# Patient Record
Sex: MALE | Race: BLACK OR AFRICAN AMERICAN | NOT HISPANIC OR LATINO | Employment: UNEMPLOYED | URBAN - METROPOLITAN AREA
[De-identification: names, ages, dates, MRNs, and addresses within clinical notes are randomized per-mention and may not be internally consistent; named-entity substitution may affect disease eponyms.]

---

## 2020-02-17 ENCOUNTER — OCCUPATIONAL HEALTH (OUTPATIENT)
Dept: URGENT CARE | Facility: CLINIC | Age: 40
End: 2020-02-17

## 2020-02-17 DIAGNOSIS — Z02.83 ENCOUNTER FOR DRUG SCREENING: Primary | ICD-10-CM

## 2020-02-17 LAB
CTP QC/QA: YES
POC 5 PANEL DRUG SCREEN: NEGATIVE

## 2020-02-17 PROCEDURE — 80305 POCT RAPID DRUG SCREEN 5 PANEL: ICD-10-PCS | Mod: QW,S$GLB,, | Performed by: EMERGENCY MEDICINE

## 2020-02-17 PROCEDURE — 80305 DRUG TEST PRSMV DIR OPT OBS: CPT | Mod: QW,S$GLB,, | Performed by: EMERGENCY MEDICINE

## 2020-11-27 ENCOUNTER — CLINICAL SUPPORT (OUTPATIENT)
Dept: REHABILITATION | Facility: HOSPITAL | Age: 40
End: 2020-11-27
Payer: MEDICAID

## 2020-11-27 DIAGNOSIS — Z78.9 IMPAIRED MOBILITY AND ADLS: ICD-10-CM

## 2020-11-27 DIAGNOSIS — R29.898 DECREASED GRIP STRENGTH: ICD-10-CM

## 2020-11-27 DIAGNOSIS — R27.8 COORDINATION IMPAIRMENT: ICD-10-CM

## 2020-11-27 DIAGNOSIS — Z74.09 IMPAIRED MOBILITY AND ADLS: ICD-10-CM

## 2020-11-27 DIAGNOSIS — R68.89 IMPAIRED FUNCTION OF UPPER EXTREMITY: ICD-10-CM

## 2020-11-27 PROCEDURE — 97165 OT EVAL LOW COMPLEX 30 MIN: CPT | Mod: PN
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
## 2020-11-27 NOTE — PROGRESS NOTES
Occupational Therapy   Evaluation    Froy Zuniga   MRN: [id number]     OT eval complete. Please see attached POC for details. Thank you for consult!    BIBI Auguste, ANANDA  11/27/2020

## 2020-12-01 ENCOUNTER — CLINICAL SUPPORT (OUTPATIENT)
Dept: REHABILITATION | Facility: HOSPITAL | Age: 40
End: 2020-12-01
Payer: MEDICAID

## 2020-12-01 DIAGNOSIS — Z74.09 IMPAIRED MOBILITY: ICD-10-CM

## 2020-12-01 DIAGNOSIS — R26.9 GAIT ABNORMALITY: Primary | ICD-10-CM

## 2020-12-01 PROCEDURE — 97161 PT EVAL LOW COMPLEX 20 MIN: CPT | Mod: PN
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
## 2020-12-04 ENCOUNTER — CLINICAL SUPPORT (OUTPATIENT)
Dept: REHABILITATION | Facility: HOSPITAL | Age: 40
End: 2020-12-04
Payer: MEDICAID

## 2020-12-04 DIAGNOSIS — R41.841 COGNITIVE COMMUNICATION DISORDER: ICD-10-CM

## 2020-12-04 PROCEDURE — 96125 COGNITIVE TEST BY HC PRO: CPT | Mod: PN
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
## 2020-12-07 NOTE — PROGRESS NOTES
See initial evaluation in Plan of Care.    Clementine Davis M.A. CCC-SLP  Speech Language Pathologist  12/7/2020

## 2020-12-08 ENCOUNTER — CLINICAL SUPPORT (OUTPATIENT)
Dept: REHABILITATION | Facility: HOSPITAL | Age: 40
End: 2020-12-08
Payer: MEDICAID

## 2020-12-08 DIAGNOSIS — Z74.09 IMPAIRED MOBILITY: ICD-10-CM

## 2020-12-08 PROCEDURE — 97116 GAIT TRAINING THERAPY: CPT | Mod: PN,CQ

## 2020-12-08 PROCEDURE — 97530 THERAPEUTIC ACTIVITIES: CPT | Mod: PN,CQ

## 2020-12-08 PROCEDURE — 97110 THERAPEUTIC EXERCISES: CPT | Mod: PN,CQ

## 2020-12-08 NOTE — PATIENT INSTRUCTIONS
Antiemboli: Isometric        Pull toes toward left knee, tense muscles on front of thigh and simultaneously squeeze buttocks. Keep leg and buttock flat on floor. Hold __5__ seconds.  Repeat _10___ times per set. Do __2__ sets per session. Do _1-2___ sessions per day.     https://[employer]/708     Copyright © [employer]. All rights reserved.   Strengthening: Hip Adduction - Isometric        With ball or folded pillow between knees, squeeze knees together. Hold __5__ seconds.  Repeat __10__ times per set. Do __2__ sets per session. Do __1-2__ sessions per day.     https://[employer]/612     Copyright © [employer]. All rights reserved.   Strengthening: Straight Leg Raise (Phase 1)        Tighten muscles on front of right thigh, then lift leg _18___ inches from surface, keeping knee locked.   Repeat __10__ times per set. Do __2__ sets per session. Do _1-2___ sessions per day.     https://[employer]/614     Copyright © [employer]. All rights reserved.   Strengthening: Terminal Knee Extension (Supine)        With right knee over bolster, straighten knee by tightening muscles on top of thigh. Keep bottom of knee on bolster.  Repeat _10___ times per set. Do __2__ sets per session. Do _1-2___ sessions per day.     https://[employer]/626     Copyright © [employer]. All rights reserved.   Hip Abduction / Adduction: with Extended Knee (Supine)        Bring left leg out to side and return. Keep knee straight.  Repeat __10__ times per set. Do _2__ sets per session. Do _1-2___ sessions per day.     https://[employer]/680     Copyright © [employer]. All rights reserved.   Self-Mobilization: Heel Slide (Supine)        Slide left heel toward buttocks until a gentle stretch is felt.   Repeat _10___ times per set. Do __2__ sets per session. Do _1-2___ sessions per day.     https://[employer]/710     Copyright © [employer]. All rights reserved.       Autonomic Dysreflexia  People with spinal cord injury (SCI) may be at risk for a serious problem called autonomic  dysreflexia (AD). If you have an SCI at level T6 or higher, you should be aware of this problem and how it could affect you.     During an AD episode, blood pressure stays high until the irritation is relieved. An AD episode that is not treated can lead to serious complications.   Understanding AD  AD occurs in episodes. The first one typically happens about 4 to 6 months after the SCI occurred. Episodes are usually triggered by irritation to the body below the point of the SCI. The irritation, often as minor as a full bladder, causes an abnormal response in the nervous system. This makes the blood vessels tighten. As a result, the blood pressure rises quickly. This can lead to stroke and even death. So, steps must be taken right away to find and relieve the irritation. This may require emergency treatment.  Symptoms of an AD episode  An AD episode can occur at any time. It can have several signs and symptoms. Along with high blood pressure, these include:  · Severe pounding headache  · Changes in heart rate (slow pulse)  · Sweating and blotchiness of the skin above the SCI, and pale, cold skin below it  · Blurred vision  · Nasal congestion  · Anxiety  · Nausea  · Goose bumps  Triggers of AD episodes  The most frequent triggers of AD episodes are the following:  · A bladder problem. This is most often an overfull bladder, which can happen if the person does not use a catheter. Or it can happen because of a clogged or kinked indwelling catheter. A urinary tract infection (UTI) can also be a trigger.  · A bowel problem. This is often a bowel filled with stool or gas. Or it could be an infection or hemorrhoids. Digital stimulation as part of a bowel program is another possible trigger.  · Other sources of discomfort. These include any other type of irritation. They could be wounds, pressure sores, broken bones, and even ingrown nails or sunburn. Sexual arousal, menstruation, and pregnancy and labor can also trigger an  AD episode.  What to do during an AD episode  Immediate treatment of an AD episode is critical. You (or your caregiver) should take the following steps if you have symptoms of an episode:  1. Sit up. This can help lower blood pressure. Keep the head elevated. The legs should be lowered if possible. Also, loosen any tight clothing, such as a belt or bra.  2. Monitor the blood pressure. It should be checked every 2 to 5 minutes.  3. Find and relieve the cause of the problem. Check the bladder and bowel first. Check the catheter (if used) and empty the bladder. If the bowel is full, empty it right away. Next, check for skin problems. These could be cuts or any kind of pressure on the skin. Also look for ingrown toenails. And check for any of the other possible triggers listed above.  4. If the problem cannot be found or relieved, call 911 or emergency services immediately. In the meantime, certain blood pressure medicines should be taken if they have been prescribed for this purpose. These can help keep the blood pressure under control until the irritation is relieved.  Prevention of AD episodes  AD episodes cant always be prevented. But good self-care is key to reducing the number of episodes you have. The following steps can help:  · Prevent your bladder from getting too full or infected (such as urinary tract infection or UTI). If you use a catheter, check it regularly for blockages.  · Follow a regular bowel program as instructed by your healthcare provider.  · Change positions often to relieve pressure on the skin from sitting or lying down.  · Watch for sores or pressure spots on the skin.  · Take all medicines as prescribed.  · Choose shoes carefully. Make sure they fit properly and arent too tight. Your healthcare provider may help with this.  · Check that there are no abimael or other objects in your shoes. Also make sure there are no wrinkles in your socks. And always wear socks with your shoes.  · Avoid  getting too much sun. Be sure to wear sunscreen and protective clothing.  · Monitor water temperatures to prevent scalding.  · Be extra careful to avoid falls, especially during transfers.  · Keep a card in your wallet that describes AD, its triggers, and proper treatment. This can help healthcare providers who may not be familiar with the condition.  · Establish a good relationship with a healthcare provider who is familiar with AD and spinal cord injuries.  For the caregiver  You can help your loved one prevent AD episodes. Learn AD triggers and symptoms. Help manage your loved ones catheter and bowel program. Watch for any skin problems or other injuries on the body below the point of the SCI. And know how to treat an AD episode. You can also help your loved one cope with AD. Living with the threat of an AD episode can be hard. Fear and depression are not uncommon in patients who have frequent episodes of AD. Talk with your loved one about his or her feelings and encourage your loved one to join a support group. Visit www.spinalcord.org for information.      Date Last Reviewed: 9/12/2015 © 2000-2017 [employer]. [address] [place] [address], [place] [address]. All rights reserved. This information is not intended as a substitute for professional medical care. Always follow your healthcare professional's instructions.

## 2020-12-09 NOTE — PROGRESS NOTES
Physical Therapy Treatment Note     Name: Froy Zuniga  Clinic Number: [id number]    Therapy Diagnosis:   Encounter Diagnosis   Name Primary?    Impaired mobility      Physician: Jeffrey Zacarias*    Visit Date: 12/8/2020    Physician Orders: PT Eval and Treat neuro program.  Medical Diagnosis from Referral: Injury of cervical spinal cord,sequela.  Evaluation Date: 12/1/2020  Authorization Period Expiration: 12/30/20  Plan of Care Expiration: 3/1/21  Visit # / Visits authorized: 2/12    Time In: 1705  Time Out: 1805  Total Billable Time: 55 minutes    Precautions: Standard and Fall, SCI, cervical collar    Subjective     Pt reports: no pain at present. Wearing abdominal binder and cervical collar. Owns KATT hose but not wearing them. Via manual w/c with son, Jr Froy, who is his primary caregiver. .  He was compliant with home exercise program given to him per rehab, which consisted of clamshells with RTB. .  Response to previous treatment: no soreness  Functional change: none stated    Pain: 0/10  Location: n/a     Objective     Froy received therapeutic exercises to develop strength, endurance, ROM, flexibility, posture and core stabilization for 35 minutes including:    Supine 10/2:   QS   SLR    HS   Hip abd long lever   Ballsqueezes   Bridges with A at hips and knees for positioning   PROM B LE's all planes: hips, knees, and ankles    Froy participated in dynamic functional therapeutic activities to improve functional performance for 10  minutes, including:    Transfers w/c-mat x 2 with CGA/min and VC for increased clearance  Sit-supine with CGA/min A and VC for technique     Froy participated in gait training to improve functional mobility and safety for 10  minutes, including:    Static standing in parallel bars pre-gait with CGA and tc for R knee TKE  Gait training x 10' in parallel bars with min to mod A for bracing R knee ext during stance and A for wider step widths for safety. VC for  sequencing.     Home Exercises Provided and Patient Education Provided     Education provided:   - Educated pt that he/she may feel soreness after session.    _instructed pt to wear KATT hose and abdominal binder for all sessions  - Issued 2 copies of autonomic dysreflexia handouts to pt for his son and his girlfriend and instruction to read over it for future reference. Discussed s/s AD, as well as triggers, symptoms, and what to do.     Written Home Exercises Provided: yes.  Exercises were reviewed and Froy was able to demonstrate them prior to the end of the session.  Froy demonstrated good  understanding of the education provided. Son verbalizes understanding.    See EMR under Patient Instructions for exercises provided 12/8/2020.    Assessment     Good tolerance for first session without pain. Tones in B LE's with PROM. Dizziness reported at end of gait training which subsided with sitting. R knee buckling with gait but able to be braced with PTA's knees for stability. Needs encouragement to perform w/c mobility without A and uses primarily LE's, requiring VC for decreased trunk A-P sway.   Froy is progressing well towards his goals.   Pt prognosis is Good/fair.     Pt will continue to benefit from skilled outpatient physical therapy to address the deficits listed in the problem list box on initial evaluation, provide pt/family education and to maximize pt's level of independence in the home and community environment.     Pt's spiritual, cultural and educational needs considered and pt agreeable to plan of care and goals.     Anticipated barriers to physical therapy: no    Goals:   Short Term Goals: 6 weeks (ongoing)  1.Decrease pain x 1 grade.  2.Start HEP. (initiated)  3.ROM of BLE WFL.  4.Strength 3+/5  5.Pt will ambulate 100' with AD with CGA.  Long Term Goals: 12 weeks   1.Pain 0-4/10.  2.Independent HEP.  3.Independent transfers.  4.ROM BLE WFL/WNL.  5.Strength BLE 5/5.  6.Pt will ambulate 500' with AD  with CGA.    Plan     Continue per POC, progressing as appropriate, working on strengthening, flexibility, transfers, and gait.     Danielle Wan, PTA

## 2020-12-10 ENCOUNTER — CLINICAL SUPPORT (OUTPATIENT)
Dept: REHABILITATION | Facility: HOSPITAL | Age: 40
End: 2020-12-10
Payer: MEDICAID

## 2020-12-10 DIAGNOSIS — Z78.9 IMPAIRED MOBILITY AND ADLS: Primary | ICD-10-CM

## 2020-12-10 DIAGNOSIS — R41.841 COGNITIVE COMMUNICATION DISORDER: ICD-10-CM

## 2020-12-10 DIAGNOSIS — Z74.09 IMPAIRED MOBILITY: ICD-10-CM

## 2020-12-10 DIAGNOSIS — R27.8 COORDINATION IMPAIRMENT: ICD-10-CM

## 2020-12-10 DIAGNOSIS — Z74.09 IMPAIRED MOBILITY AND ADLS: Primary | ICD-10-CM

## 2020-12-10 DIAGNOSIS — R29.898 DECREASED GRIP STRENGTH: ICD-10-CM

## 2020-12-10 DIAGNOSIS — R68.89 IMPAIRED FUNCTION OF UPPER EXTREMITY: ICD-10-CM

## 2020-12-10 PROCEDURE — 97530 THERAPEUTIC ACTIVITIES: CPT | Mod: PN,59

## 2020-12-10 PROCEDURE — 97110 THERAPEUTIC EXERCISES: CPT | Mod: PN,59

## 2020-12-10 PROCEDURE — 92507 TX SP LANG VOICE COMM INDIV: CPT | Mod: PN

## 2020-12-10 NOTE — PROGRESS NOTES
Occupational Therapy Treatment Note     Date: 12/10/2020  Name: Froy Zuniga  Clinic Number: [id number]    Therapy Diagnosis:   Encounter Diagnoses   Name Primary?    Impaired mobility and ADLs Yes    Decreased  strength     Coordination impairment     Impaired function of upper extremity      Physician: Jeffrey Zacarias*    Physician Orders: OT eval & Treat; neuro program  Medical Diagnosis: S14.109S (ICD-10-CM) - Injury of cervical spinal cord, sequela  Evaluation Date: 11/27/2020  Plan of Care Expiration Period: 2/19/2020  Insurance Authorization period Expiration: 12/31/2020  Date of Return to MD: tba  Visit # / Visits Authorized: 1 / 12    Time In:1520  Time Out: 1600  Total Billable Time: 40 minutes    Precautions:  Standard, blood thinners and Fall      Subjective     Pt reports: he had help with getting his shirt and jacket on today   he was compliant with home exercise program given last session (none given).   Response to previous treatment: no concerns  Functional change: none noted    Pain: 0/10  Location:  Neck; though he did c/o muscle spasm during sit->supine     Objective       Froy participated in dynamic functional therapeutic activities to improve functional performance for 40 minutes, including:    -Min A w/c<>mat squat-pivot t/f.  -Sit<>supine w/ CGA  -R UE: PROM   Abduction=93  ER@0*abd=31, ER@45*abd=40, ER@90*abd=26    -Gentle mobilization of joints involving R UE phalanges, metacarpals, carpals, radius and ulna. Gentle stretching of the soft tissues of the R wrist and hand to decrease edema and improve PROM, potentially allowing for increases in active movement.   -Pt ed re:  strengthening with yellow putty w/ min A for repositioning the putty in his hand. Increased time spent with this due to pt's concern about improving . Issued putty for HEP.    Home Exercises and Education Provided     Education provided:   - Progress towards goals   - OT issued communication  folder to increase communication between pt/family & staff. Ed that we will use it for brief daily updates to family, family/pt is welcome to write notes to staff in the daily communication pages and all HEP should be kept within the folder. He needs to bring it to therapy every session. He v/u.    Written Home Exercises Provided: yes.  Exercises were reviewed and Froy was able to demonstrate them prior to the end of the session.  Froy demonstrated good  understanding of the HEP provided.     See EMR under Patient Instructions for exercises provided 12/10/2020.       Assessment     Pt would continue to benefit from skilled OT. He is able to complete his t/f with min A and demo's understanding of HEP for  strengthening.     Froy is progressing well towards his goals and there are no updates to goals at this time. Pt prognosis is Fair.     Pt will continue to benefit from skilled outpatient occupational therapy to address the deficits listed in the problem list on initial evaluation provide pt/family education and to maximize pt's level of independence in the home and community environment.     Anticipated barriers to occupational therapy: high level of injury, possibly transportation as pt is unable to drive    Pt's spiritual, cultural and educational needs considered and pt agreeable to plan of care and goals.    Goals:  Short Term Goals: 4 weeks   Pt will be indep w/ initial HEP. (progressing 12/10/2020)   Pt will be able to squeeze 3# with the R UE, 30# w/ the L UE. (progressing 12/10/2020)   UB dressing w/ Mod I after s/u. (progressing 12/10/2020)   Grooming w/ Mod I w/ all supplies in reach. (progressing 12/10/2020)      Long Term Goals: 12 weeks   Pt will be mod I w/ progressive strengthening HEP. (progressing 12/10/2020)   Pt will be mod I w/ UB dressing. (progressing 12/10/2020)   Pt will complete LB dressing w/ min A and AE PRN. (progressing 12/10/2020)   Erik UE strength will increase by at least 1/2  grade throughout the arm. (progressing 12/10/2020)   Erik UE coordination will improve, as evidenced by a Box & Block score of at least 10 on the R UE and 50 on the L UE. (progressing 12/10/2020)   Supervision or better toileting and t/f. (progressing 12/10/2020)   Bed mobility goal to be set once assessed.  Goals for [facility] to be set as appropriate.    Plan   Certification Period/Plan of care expiration: 11/27/2020 to 2/19/2021.     Outpatient Occupational Therapy 2 times weekly for 12 weeks to include the following interventions: Manual Therapy, Neuromuscular Re-ed, Orthotic Management and Training, Patient Education, Self Care, Therapeutic Activities, Therapeutic Taping, and Therapeutic Exercise.    Updates/Grading for next session: review  strengthening    Maribeth Lima OT

## 2020-12-10 NOTE — PROGRESS NOTES
Physical Therapy Treatment Note     Name: Froy Zuniga  Clinic Number: [id number]    Therapy Diagnosis:   Encounter Diagnosis   Name Primary?    Impaired mobility      Physician: Jeffrey Zacarias MD    Visit Date: 12/10/2020    Physician Orders: eval and treat  Medical Diagnosis: Injury of cervical spinal cord  Date of Evaluation:  12/4/2020   Insurance Authorization Period: 11/25/2021  Plan of Care Certification:    12/4/2020 to 2/26/2021  Visit # / Visits Authorized:  3/ 12     Time In: 1545  Time Out: 1430  Total Billable Time: 55 minutes    Precautions: Standard and Fall, SCI, cervical collar    Subjective     Pt reports no subjective complaints.  : He was compliant with home exercise program given to him per rehab, which consisted of clamshells with RTB. .  Response to previous treatment: no soreness  Functional change: none stated    Pain: 0/10  Location: n/a     Objective     Froy received therapeutic exercises to develop strength, endurance, ROM, flexibility, posture and core stabilization for 45 minutes including:  Hamstring/heelcord stretches  Bridging 20  SAQ's manual resist to L 20 each  SLR 4#s cuff weights to Left 20 each.  Crooklying hip abduction/.adduction manual resist 20/20  Sit to stand 20  Weight shifts/side to side sway 20  GT with HHA 20 ft x 2.    Home Exercises Provided and Patient Education Provided     Education provided:   - Educated on importance of right side weight bearing to inhibit muscle spasm.    Written Home Exercises Provided: yes.  Exercises were reviewed and Froy was able to demonstrate them prior to the end of the session.  Froy demonstrated good  understanding of the education provided. Son verbalizes understanding.    See EMR under Patient Instructions for exercises provided 12/8/2020.    Assessment     Patient demonstrated adequate knee stability but no upperbody support for RW ambulation. Ambulated with HHA. Needs mod-max A to modify midline alignment and stay  on task. Tolerated Rx well.  /83 supine; 98/69 sitting and 113/78mmHg after walking. Hr 79-83bpm throughout activity.   Froy is progressing well towards his goals.   Pt prognosis is Good/fair.     Pt will continue to benefit from skilled outpatient physical therapy to address the deficits listed in the problem list box on initial evaluation, provide pt/family education and to maximize pt's level of independence in the home and community environment.     Pt's spiritual, cultural and educational needs considered and pt agreeable to plan of care and goals.     Anticipated barriers to physical therapy: no    Goals: (ongoing)  1.Decrease pain x 1 grade.  2.Start HEP.  3.ROM of BLE WFL.  4.Strength 3+/5  5.Pt will ambulate 100' with AD with CGA.    Plan     GT/Weight shifting  Continue POC    Devin Lee, PT
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
## 2020-12-15 ENCOUNTER — CLINICAL SUPPORT (OUTPATIENT)
Dept: REHABILITATION | Facility: HOSPITAL | Age: 40
End: 2020-12-15
Payer: MEDICAID

## 2020-12-15 DIAGNOSIS — Z78.9 IMPAIRED MOBILITY AND ADLS: Primary | ICD-10-CM

## 2020-12-15 DIAGNOSIS — Z74.09 IMPAIRED MOBILITY AND ADLS: Primary | ICD-10-CM

## 2020-12-15 DIAGNOSIS — R27.8 COORDINATION IMPAIRMENT: ICD-10-CM

## 2020-12-15 DIAGNOSIS — R68.89 IMPAIRED FUNCTION OF UPPER EXTREMITY: ICD-10-CM

## 2020-12-15 DIAGNOSIS — Z74.09 IMPAIRED MOBILITY: ICD-10-CM

## 2020-12-15 DIAGNOSIS — R41.841 COGNITIVE COMMUNICATION DISORDER: ICD-10-CM

## 2020-12-15 DIAGNOSIS — R29.898 DECREASED GRIP STRENGTH: ICD-10-CM

## 2020-12-15 PROCEDURE — 97530 THERAPEUTIC ACTIVITIES: CPT | Mod: PN,59

## 2020-12-15 PROCEDURE — 97530 THERAPEUTIC ACTIVITIES: CPT | Mod: PN,CQ

## 2020-12-15 PROCEDURE — 97110 THERAPEUTIC EXERCISES: CPT | Mod: PN,CQ,59

## 2020-12-15 PROCEDURE — 92507 TX SP LANG VOICE COMM INDIV: CPT | Mod: PN

## 2020-12-15 NOTE — PATIENT INSTRUCTIONS
Home Exercise Program:     1. Putty :     Squeeze putty in hand trying to keep it round by rotating putty after each squeeze. Push fingers through putty to palm each time. Avoid excessive pain. 20 squeezes, 3 times per day.     2.  Thumb Putty Pinch:    Hold the putty at the top of your hand. Squeeze the putty between your thumb and the side of your 2nd finger as shown. Repeat 15 pinches. 3 times per day.

## 2020-12-15 NOTE — PROGRESS NOTES
"Outpatient Neurological Rehabilitation   Speech and Language Therapy Treatment Note  Date:  12/15/2020     Name: Froy Zuniga   MRN: [id number]   Therapy Diagnosis:   Encounter Diagnosis   Name Primary?    Cognitive communication disorder    Physician: Jeffrey Zacarias*  Physician Orders: Ambulatory Referral to Speech Therapy  Medical Diagnosis: Injury of cervical spinal cord    Visit #/ Visits Authorized: 2/ 12  Date of Evaluation:  12/4/2020  Insurance Authorization Period: 12/8/2020-4/26/2020  Plan of Care Expiration Date:    2/26/2021  Extended POC:  n/a   Progress Note: due 1/4/2021   Visits Cancelled: 0  Visits No Show: 0    Time In:  1137  Time Out:  1220  Total Billable Time: 42    Precautions: Standard, Fall and Cervical Precautions  Subjective:   Pt reports: "I am just mad and want my life back."   He was compliant to home exercise program.   Response to previous treatment: n/a   Pain Scale:  0/10 on VAS currently.   Pain Location: n/a  Objective:   UNTIMED  Procedure Min.   Speech- Language- Voice Therapy  42   Total Timed Units: 0  Total Untimed Units: 1  Charges Billed/# of units: 1    Short Term Goals: (8 weeks) Current Progress:   Pt will recall 4 memory strategies independently 3 times overall.    Progressing/ Not Met 12/15/2020   Memory Strategies reviewed.    Pt will recall details from a paragraph presented verbally with 80% accuracy independently immediately following presentation.     Progressing/ Not Met 12/15/2020   Not addressed in today's session.    Pt will complete moderate level problem solving tasks with minimal cues for accurate completion.     Progressing/ Not Met 12/15/2020   Pt completed a moderate level problem solving task where the patient had to plan the most efficient route for a given list of errands on a map. Moderate cues for completion.    Pt rated the task a 4 on The Relative Scale of Cognitive Load     Pt will complete moderate level visual attention tasks tasks " with minimal cues for accurate completion.     Progressing/ Not Met 12/15/2020   Not addressed in today's session.     Pt will sort given medications with 90% accuracy given minimal cues.     Progressing/ Not Met 12/15/2020   Pt sorted given medications with 80% accuracy independently. 90% achieved given minimal cues.    Met x1   Pt will complete goal plan action review with 90% accuracy independently.      Progressing/ Not Met 12/15/2020   Not addressed in today's session.       Patient Education/Response:   Pt educated on the cognitive triangle and the spoon theory for TBI.     Written Home Exercises Provided: Patient instructed to cont prior HEP.  Exercises were reviewed and Froy was able to demonstrate them prior to the end of the session.  Froy demonstrated good  understanding of the education provided.     See EMR under Patient Instructions for exercises provided 12/10/2020.  Assessment:   Froy is progressing well towards his goals. Safety awareness and judgement with fatigue discussed with patient. Pt with overall difficulty initiating most tasks. Pt currently has no responsibilities at home. Current goals remain appropriate. Goals to be updated as necessary.     Pt prognosis is Good. Pt will continue to benefit from skilled outpatient speech and language therapy to address the deficits listed in the problem list on initial evaluation, provide pt/family education and to maximize pt's level of independence in the home and community environment.   Medical necessity is demonstrated by the following IMPAIRMENTS:  Deficits in executive functioning, attention, and memory prevent the pt from relaying medically and safety relevant information in a timely manner in a state of emergency.  Barriers to Therapy: none identified  Pt's spiritual, cultural and educational needs considered and pt agreeable to plan of care and goals.  Plan:   Continue POC with focus on rehabilitation and compensation for multiple domains of  cognition.     Clementine Davis CCC-SLP   12/15/2020

## 2020-12-15 NOTE — PROGRESS NOTES
Physical Therapy Treatment Note     Name: Froy Zuniga  Clinic Number: [id number]    Therapy Diagnosis:   Encounter Diagnosis   Name Primary?    Impaired mobility      Physician: Jeffrey Zacarias*    Visit Date: 12/15/2020    Physician Orders: PT Eval and Treat neuro program.  Medical Diagnosis from Referral: Injury of cervical spinal cord,sequela.  Evaluation Date: 12/1/2020  Authorization Period Expiration: 12/30/20  Plan of Care Expiration: 3/1/21  Visit # / Visits authorized: 4/12    Time In: 1447  Time Out: 1545  Total Billable Time: 55 minutes    Precautions: Standard and Fall, SCI, cervical collar    Subjective     Pt reports: no pain at present. Wearing abdominal binder and cervical collar. Wearing KATT hose. Via manual w/c with son, Jr Froy, who is his primary caregiver, and father, Froy, .   He was compliant with home exercise program given to him per rehab, which consisted of clamshells with RTB. .  Response to previous treatment: no soreness  Functional change: none stated    Pain: 0/10  Location: n/a     Objective       BP in w/c before session: 116/77, HR 66  BP supine after ex's 143/89, HR 68  BP with initial sitting 114/70, HR 80    A for readjustment and tightening of abdominal binder    Froy received therapeutic exercises to develop strength, endurance, ROM, flexibility, posture and core stabilization for 40 minutes including:    Supine 10/2:   SLR    HS   Bridges with A at hips and knees for positioning   PROM B LE's all planes: hips, knees, and ankles   AP B 10/2   Lino's knee-shin-ankle touches 10/10 L/R     Seated:   Trunk A-P, L-R leans with min A 10 each    Froy participated in dynamic functional therapeutic activities to improve functional performance for 15 minutes, including:    Transfers w/c-mat x 2 with CGA  Sit-supine with CGA/min A and VC for technique   Static standing x 5 minutes with min A and tc for R>L knee TKE 2*  Buckling  Standing lateral weight shifting L/R  with min A and tc for B knee ext    Home Exercises Provided and Patient Education Provided     Education provided:   - Educated pt that he/she may feel soreness after session.    _instructed pt to wear KATT hose and abdominal binder for all sessions  - Reviewed s/s AD with pt and family    Written Home Exercises Provided: Patient instructed to cont prior HEP.  Exercises were reviewed and Froy was able to demonstrate them prior to the end of the session.  Froy demonstrated good  understanding of the education provided. Son verbalizes understanding.    See EMR under Patient Instructions for exercises provided 12/8/2020.    Assessment     Buckling LE's with standing today, possibly 2* LE fatigue. TC for proper foot placement with Lino's ex's.     Froy is progressing well towards his goals.   Pt prognosis is Good/fair.     Pt will continue to benefit from skilled outpatient physical therapy to address the deficits listed in the problem list box on initial evaluation, provide pt/family education and to maximize pt's level of independence in the home and community environment.     Pt's spiritual, cultural and educational needs considered and pt agreeable to plan of care and goals.     Anticipated barriers to physical therapy: no    Goals:   Short Term Goals: 6 weeks (ongoing)  1.Decrease pain x 1 grade.  2.Start HEP. (initiated)  3.ROM of BLE WFL.  4.Strength 3+/5  5.Pt will ambulate 100' with AD with CGA.    Long Term Goals: 12 weeks   1.Pain 0-4/10.  2.Independent HEP.  3.Independent transfers.  4.ROM BLE WFL/WNL.  5.Strength BLE 5/5.  6.Pt will ambulate 500' with AD with CGA.    Plan     Continue per POC, progressing as appropriate, working on strengthening, flexibility, transfers, and gait.     Danielle Wan, PTA

## 2020-12-15 NOTE — PROGRESS NOTES
Occupational Therapy Treatment Note     Date: 12/15/2020  Name: Froy Zuniga  Clinic Number: [id number]    Therapy Diagnosis:   No diagnosis found.  Physician: Jeffrey Zacarias*    Physician Orders: OT eval & Treat; neuro program  Medical Diagnosis: S14.109S (ICD-10-CM) - Injury of cervical spinal cord, sequela  Evaluation Date: 11/27/2020  Plan of Care Expiration Period: 2/19/2020  Insurance Authorization period Expiration: 12/31/2020  Date of Return to MD: tba  Visit # / Visits Authorized: 2 / 12    Time In:1352  Time Out: 1433  Total Billable Time: 40 minutes    Precautions:  Standard, blood thinners and Fall      Subjective     Pt reports: he had help with getting his shirt and jacket on today   he was compliant with home exercise program given last session for putty, but can't remember what we did in our last session   Response to previous treatment: no concerns  Functional change: none noted by pt    Pain: 0/10  Location:  Neck; though he did c/o muscle spasm during sit->supine     Objective       Froy participated in dynamic functional therapeutic activities to improve functional performance for 40 minutes, including:    When OT met pt in [facility], his son was placing his medications into his mouth despite the fact that Froy's L UE is working sufficiently for med management. He did not bring his yellow communication folder with him.     CGA squat-pivot w/c->mat t/f.    Gentle mobilization of joints involving R UE phalanges, metacarpals, carpals, radius and ulna. Gentle stretching of the soft tissues of the R wrist and hand to decrease edema and improve PROM, potentially allowing for increases in active movement.     fxl use R UE for support w/ tactile input at hand and distal triceps for stabilization. Pt was able to push through hand (positioned on yoga mat to increase stability and decrease chance for the hand slipping) to lift himself for repositioning and pressure relief. Multiple trials.    Mario  jacket x2 w/ min A; don jacket x1 w/ mod A    Seated at EOM, attempted touching chin    Hand Strength (OSMAR Dynamometer, Setting II)   (lbs) Right 12/15/2020    Right 11/27/2020   1 4 0   2 3 0   3 2 0   avg 3 0   [norms for men aged 40-44: R=116.8 +/-20.7; L=112.8 +/-18.7 (Micki et al, 1985)]    Sit->supine w/ SBA    Supine chest press 2x5 w/ facilitation of scap rotation and min A  OT ed pt & son for completion of AAROM chest press at home. They completed x5 w/ min cues for best performance.  Supine reach to chin AROM x5    Supine ->sit w/ supervision    Mat->w/c squat-pivot t/f to the R w/ min A, as well as assist to keep the R UE positioned on the armrest of the w/c. Arm-rest wrapped with yoga-mat to provide non-slip surface, increasing his ability to use the R UE during transfers and for repositioning himself in his w/c.     Home Exercises and Education Provided     Education provided:   - Progress towards goals   - need to bring communication folder!  - ed pt & family that Froy needs to be encouraged to do as much as possible for himself within the limits of safety. Family is encouraged to help with specific challenges in a task, but not to complete tasks for pt. Pt, son & father v/u.     Written Home Exercises Provided: Patient instructed to cont prior HEP.  Exercises were reviewed and Froy was able to demonstrate them prior to the end of the session.  Froy demonstrated good  understanding of the HEP provided.     See EMR under Patient Instructions for exercises provided 12/10/2020.       Assessment     Pt would continue to benefit from skilled OT. Froy met the ST goal for R UE  strength today! He also demo'd ability to use the R UE for support in a functional way, which is a significant improvement from last week.      He is able to complete his t/f with min A and demo's understanding of HEP for  strengthening.     Froy is progressing well towards his goals and there are no updates to goals  at this time. Pt prognosis is Fair.     Pt will continue to benefit from skilled outpatient occupational therapy to address the deficits listed in the problem list on initial evaluation provide pt/family education and to maximize pt's level of independence in the home and community environment.     Anticipated barriers to occupational therapy: high level of injury, possibly transportation as pt is unable to drive    Pt's spiritual, cultural and educational needs considered and pt agreeable to plan of care and goals.    Goals:  Short Term Goals: 4 weeks   Pt will be indep w/ initial HEP. (progressing 12/15/2020)   Pt will be able to squeeze 3# with the R UE, 30# w/ the L UE. (MET for R UE 12/15/2020; progressing for L UE 12/15/2020)   UB dressing w/ Mod I after s/u. (progressing 12/15/2020)   Grooming w/ Mod I w/ all supplies in reach. (progressing 12/15/2020)      Long Term Goals: 12 weeks   Pt will be mod I w/ progressive strengthening HEP. (progressing 12/15/2020)   Pt will be mod I w/ UB dressing. (progressing 12/15/2020)   Pt will complete LB dressing w/ min A and AE PRN. (progressing 12/15/2020)   Erik UE strength will increase by at least 1/2 grade throughout the arm. (progressing 12/15/2020)   Erik UE coordination will improve, as evidenced by a Box & Block score of at least 10 on the R UE and 50 on the L UE. (progressing 12/15/2020)   Supervision or better toileting and t/f. (progressing 12/15/2020)   New Goal: Mod I bed mobility. (progressing 12/15/2020)   Goals for [facility] to be set as appropriate.    Plan   Certification Period/Plan of care expiration: 11/27/2020 to 2/19/2021.     Outpatient Occupational Therapy 2 times weekly for 12 weeks to include the following interventions: Manual Therapy, Neuromuscular Re-ed, Orthotic Management and Training, Patient Education, Self Care, Therapeutic Activities, Therapeutic Taping, and Therapeutic Exercise.    Updates/Grading for next session: review new HEP, UB  dressing    Maribeth Lima, OT

## 2020-12-17 ENCOUNTER — OFFICE VISIT (OUTPATIENT)
Dept: UROLOGY | Facility: CLINIC | Age: 40
End: 2020-12-17
Payer: MEDICAID

## 2020-12-17 ENCOUNTER — CLINICAL SUPPORT (OUTPATIENT)
Dept: REHABILITATION | Facility: HOSPITAL | Age: 40
End: 2020-12-17
Payer: MEDICAID

## 2020-12-17 VITALS — HEART RATE: 73 BPM | SYSTOLIC BLOOD PRESSURE: 112 MMHG | DIASTOLIC BLOOD PRESSURE: 74 MMHG

## 2020-12-17 DIAGNOSIS — R41.841 COGNITIVE COMMUNICATION DISORDER: ICD-10-CM

## 2020-12-17 DIAGNOSIS — N31.9 NEUROGENIC BLADDER: ICD-10-CM

## 2020-12-17 DIAGNOSIS — S14.109S INJURY OF CERVICAL SPINAL CORD, SEQUELA: ICD-10-CM

## 2020-12-17 DIAGNOSIS — R29.898 DECREASED GRIP STRENGTH: ICD-10-CM

## 2020-12-17 DIAGNOSIS — Z74.09 IMPAIRED MOBILITY: Primary | ICD-10-CM

## 2020-12-17 DIAGNOSIS — G82.20 PARAPLEGIA: Primary | ICD-10-CM

## 2020-12-17 DIAGNOSIS — R26.9 GAIT ABNORMALITY: ICD-10-CM

## 2020-12-17 DIAGNOSIS — Z78.9 IMPAIRED MOBILITY AND ADLS: Primary | ICD-10-CM

## 2020-12-17 DIAGNOSIS — R27.8 COORDINATION IMPAIRMENT: ICD-10-CM

## 2020-12-17 DIAGNOSIS — Z74.09 IMPAIRED MOBILITY AND ADLS: Primary | ICD-10-CM

## 2020-12-17 DIAGNOSIS — R68.89 IMPAIRED FUNCTION OF UPPER EXTREMITY: ICD-10-CM

## 2020-12-17 PROCEDURE — 99999 PR PBB SHADOW E&M-EST. PATIENT-LVL III: CPT | Mod: PBBFAC,,, | Performed by: UROLOGY

## 2020-12-17 PROCEDURE — 97110 THERAPEUTIC EXERCISES: CPT | Mod: PN,CQ,59

## 2020-12-17 PROCEDURE — 99204 PR OFFICE/OUTPT VISIT, NEW, LEVL IV, 45-59 MIN: ICD-10-PCS | Mod: S$PBB,,, | Performed by: UROLOGY

## 2020-12-17 PROCEDURE — 97530 THERAPEUTIC ACTIVITIES: CPT | Mod: PN,59

## 2020-12-17 PROCEDURE — 92507 TX SP LANG VOICE COMM INDIV: CPT | Mod: PN

## 2020-12-17 PROCEDURE — 99204 OFFICE O/P NEW MOD 45 MIN: CPT | Mod: S$PBB,,, | Performed by: UROLOGY

## 2020-12-17 PROCEDURE — 97530 THERAPEUTIC ACTIVITIES: CPT | Mod: PN,CQ

## 2020-12-17 PROCEDURE — 99999 PR PBB SHADOW E&M-EST. PATIENT-LVL III: ICD-10-PCS | Mod: PBBFAC,,, | Performed by: UROLOGY

## 2020-12-17 PROCEDURE — 99213 OFFICE O/P EST LOW 20 MIN: CPT | Mod: PBBFAC,PO | Performed by: UROLOGY

## 2020-12-17 RX ORDER — TAMSULOSIN HYDROCHLORIDE 0.4 MG/1
0.4 CAPSULE ORAL DAILY
COMMUNITY
Start: 2020-11-19 | End: 2021-02-17

## 2020-12-17 RX ORDER — ACETAMINOPHEN 500 MG
5 TABLET ORAL DAILY
COMMUNITY
Start: 2020-11-19 | End: 2021-02-17

## 2020-12-17 RX ORDER — ASPIRIN 81 MG/1
81 TABLET ORAL DAILY
COMMUNITY
Start: 2020-11-20 | End: 2021-02-18

## 2020-12-17 RX ORDER — LIDOCAINE 50 MG/G
1 PATCH TOPICAL
COMMUNITY
Start: 2020-11-20 | End: 2021-02-18

## 2020-12-17 RX ORDER — DICLOFENAC SODIUM 10 MG/G
1 GEL TOPICAL
COMMUNITY
Start: 2020-11-19 | End: 2021-02-17

## 2020-12-17 RX ORDER — OXYCODONE HYDROCHLORIDE 5 MG/1
5 TABLET ORAL
COMMUNITY
Start: 2020-11-23 | End: 2021-03-28

## 2020-12-17 RX ORDER — METHOCARBAMOL 750 MG/1
750 TABLET, FILM COATED ORAL DAILY
COMMUNITY
Start: 2020-11-19 | End: 2021-02-17

## 2020-12-17 RX ORDER — GABAPENTIN 300 MG/1
600 CAPSULE ORAL DAILY
COMMUNITY
Start: 2020-11-19 | End: 2021-02-17

## 2020-12-17 RX ORDER — ENOXAPARIN SODIUM 100 MG/ML
40 INJECTION SUBCUTANEOUS DAILY
COMMUNITY
Start: 2020-11-20 | End: 2021-01-06

## 2020-12-17 RX ORDER — BUSPIRONE HYDROCHLORIDE 5 MG/1
5 TABLET ORAL DAILY
COMMUNITY
Start: 2020-11-19 | End: 2021-05-12

## 2020-12-17 RX ORDER — BETHANECHOL CHLORIDE 5 MG/1
10 TABLET ORAL DAILY
COMMUNITY
Start: 2020-11-19 | End: 2021-02-17

## 2020-12-17 RX ORDER — ESCITALOPRAM OXALATE 10 MG/1
10 TABLET ORAL DAILY
COMMUNITY
Start: 2020-11-20 | End: 2021-02-18

## 2020-12-17 NOTE — PROGRESS NOTES
"  Physical Therapy Treatment Note     Name: Froy Zuniga  Clinic Number: [id number]    Therapy Diagnosis:   Encounter Diagnoses   Name Primary?    Impaired mobility Yes    Gait abnormality     Injury of cervical spinal cord, sequela      Physician: Jeffrey Zacarias*    Visit Date: 12/17/2020    Physician Orders: PT Eval and Treat neuro program.  Medical Diagnosis from Referral: Injury of cervical spinal cord,sequela.  Evaluation Date: 12/1/2020  Authorization Period Expiration: 12/30/20  Plan of Care Expiration: 3/1/21  Visit # / Visits authorized: 5/12    Time In: 0956  Time Out: 1044  Total Billable Time: 48 minutes    Precautions: Standard and Fall, SCI, cervical collar    Subjective     Pt reports: "I am doing ok today"  He was not compliant with home exercise program.  Response to previous treatment: no soreness  Functional change: none stated     Pain: 0/10  Location: n/a     Objective       BP in w/c before session: 131/85, HR 70  BP supine after ex's 143/90, HR 58  BP with initial sitting 100/68, HR 72    Froy received therapeutic exercises to develop strength, endurance, ROM, flexibility, posture and core stabilization for 35 minutes including:  Supine 10/2:   SLR    HS   Bridges with A at hips and knees for positioning   AP B    Lino's knee-shin-ankle touches 20/20 L/R   LAQ     Seated:   Scooting L/R and fwd/bwd    Froy participated in dynamic functional therapeutic activities to improve functional performance for 10 minutes, including:    Transfers w/c-mat x 2 with CGA  Sit-supine with CGA/min A and VC for technique   // bars:   Fwd/bwd gait 10ft x 4   Side stepping 10ft x 4   Marching 2/10   Heel raises 2/10       Home Exercises Provided and Patient Education Provided     Education provided:   - Educated pt that he/she may feel soreness after session.    _instructed pt to wear KATT hose and abdominal binder for all sessions  - Reviewed s/s AD with pt and family    Written Home Exercises " Provided: Patient instructed to cont prior HEP.  Exercises were reviewed and Froy was able to demonstrate them prior to the end of the session.  Froy demonstrated good  understanding of the education provided. Son verbalizes understanding.    See EMR under Patient Instructions for exercises provided 12/8/2020.    Assessment     Patient with gait in // bars with very wide and long stride, knees occasionally buckling, however, patient was able to maintain upright with no balance deficits requiring PTA assist.  Remains with tone occasionally causing UE's to flex.  Good performance of therex with increased assist on R LE with stretching.    Froy is progressing well towards his goals.   Pt prognosis is Good/fair.     Pt will continue to benefit from skilled outpatient physical therapy to address the deficits listed in the problem list box on initial evaluation, provide pt/family education and to maximize pt's level of independence in the home and community environment.     Pt's spiritual, cultural and educational needs considered and pt agreeable to plan of care and goals.     Anticipated barriers to physical therapy: no    Goals:   Short Term Goals: 6 weeks (ongoing)  1.Decrease pain x 1 grade.(progressing)  2.Start HEP. (initiated)  3.ROM of BLE WFL.(progressing)  4.Strength 3+/5(progressing)  5.Pt will ambulate 100' with AD with CGA.(progressing)    Long Term Goals: 12 weeks   1.Pain 0-4/10.(progressing)  2.Independent HEP.(progressing)  3.Independent transfers.(progressing)  4.ROM BLE WFL/WNL. (progressing)  5.Strength BLE 5/5.(progressing)  6.Pt will ambulate 500' with AD with CGA.(progressing)    Plan     Continue per POC, progressing as appropriate, working on strengthening, flexibility, transfers, and gait.     Baylee Castanon, PTA
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
## 2020-12-17 NOTE — PROGRESS NOTES
"Outpatient Neurological Rehabilitation   Speech and Language Therapy Treatment Note  Date:  12/17/2020     Name: Froy Zuniga   MRN: [id number]   Therapy Diagnosis:   Encounter Diagnosis   Name Primary?    Cognitive communication disorder    Physician: Jeffrey Zacarias*  Physician Orders: Ambulatory Referral to Speech Therapy  Medical Diagnosis: Injury of cervical spinal cord    Visit #/ Visits Authorized: 3 / 12  Date of Evaluation:  12/4/2020  Insurance Authorization Period: 12/8/2020-4/26/2020  Plan of Care Expiration Date:    2/26/2021  Extended POC:  n/a   Progress Note: due 1/4/2021   Visits Cancelled: 0  Visits No Show: 0    Time In:  1130  Time Out:  1215  Total Billable Time: 45    Precautions: Standard, Fall and Cervical Precautions  Subjective:   Pt reports: "Ms. Canales takes care of all of that."   He was compliant to home exercise program.   Response to previous treatment: Deficits in awareness continue  Pain Scale:  0/10 on VAS currently.   Pain Location: n/a  Objective:   UNTIMED  Procedure Min.   Speech- Language- Voice Therapy  45   Total Timed Units: 0  Total Untimed Units: 1  Charges Billed/# of units: 1    Short Term Goals: (8 weeks) Current Progress:   Pt will recall 4 memory strategies independently 3 times overall.    Progressing/ Not Met 12/17/2020   Pt recalled 3 memory strategies independently, 4 given minimal cues.    Pt will recall details from a paragraph presented verbally with 80% accuracy independently immediately following presentation.     Progressing/ Not Met 12/17/2020   Pt recalled details from a moderate length paragraph with 70% accuracy independently immediately following presentation.    Pt will complete moderate level problem solving tasks with minimal cues for accurate completion.     Progressing/ Not Met 12/17/2020   Pt completed a moderate level problem solving task where the patient had to plan the most efficient route for a given list of errands on a map. " Minimal cues for completion.    Pt rated the task a 2 on The Relative Scale of Cognitive Load    Met x1   Pt will complete moderate level visual attention tasks tasks with minimal cues for accurate completion.     Progressing/ Not Met 12/17/2020   Pt completed moderate level visual attention task with moderate cues for accurate completion. Some spontaneous strategy use: self-talk and notation.    Pt will sort given medications with 90% accuracy given minimal cues.     Progressing/ Not Met 12/17/2020   Not addressed in today's session.   Met x1   Pt will complete goal plan action review with 90% accuracy independently.      Progressing/ Not Met 12/17/2020   Not addressed in today's session.       Patient Education/Response:   Pt educated awareness and safety judgement as they relate to fatigue.     Written Home Exercises Provided: Patient instructed to cont prior HEP.  Exercises were reviewed and Froy was able to demonstrate them prior to the end of the session.  Froy demonstrated good  understanding of the education provided.     See EMR under Patient Instructions for exercises provided 12/10/2020.  Assessment:   Froy is progressing well towards his goals. Pt with decreased awareness of deficits which impedes initiation of task. Pt appears comfortable allowing family to complete tasks is he able to do. Attention deficits noted throughout the session as patient required multiple instances of redirection to task.   Current goals remain appropriate. Goals to be updated as necessary.     Pt prognosis is Good. Pt will continue to benefit from skilled outpatient speech and language therapy to address the deficits listed in the problem list on initial evaluation, provide pt/family education and to maximize pt's level of independence in the home and community environment.   Medical necessity is demonstrated by the following IMPAIRMENTS:  Deficits in executive functioning, attention, and memory prevent the pt from relaying  medically and safety relevant information in a timely manner in a state of emergency.  Barriers to Therapy: none identified  Pt's spiritual, cultural and educational needs considered and pt agreeable to plan of care and goals.  Plan:   Continue POC with focus on rehabilitation and compensation for multiple domains of cognition.     Clementine Davis CCC-SLP   12/17/2020
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
## 2020-12-20 RX ORDER — BETHANECHOL CHLORIDE 50 MG/1
50 TABLET ORAL 3 TIMES DAILY
Qty: 90 TABLET | Refills: 11 | Status: CANCELLED | OUTPATIENT
Start: 2020-12-20 | End: 2021-12-20

## 2020-12-20 NOTE — TELEPHONE ENCOUNTER
I called [facility] to order the bethanecol but they are closed at this time and I will call when the pharmacy is open. I need to know what the dosage is that pt is getting, and then order two of whatever he is getting three times a day. This amounts to 6 tablets a day, dispense 180 per month, with 11 refills.

## 2020-12-20 NOTE — TELEPHONE ENCOUNTER
"I called [facility] again and they are open now. The pharmacist said that what the pt is taking is 5 mg urecholine pills.     Since the pt said that he is able to void as long as he takes "two tablets three times a day", I am ordering one tablet of 10 mg tid, disp 90, 11 refills. Pharmacist accepted the order.    Epic did not allow the use of a 10 mg, as it considers that 25 and 50 mg are the only doses available. I called the prescription orally, so I could bypass the epic ordering system.  "

## 2020-12-21 ENCOUNTER — CLINICAL SUPPORT (OUTPATIENT)
Dept: REHABILITATION | Facility: HOSPITAL | Age: 40
End: 2020-12-21
Payer: MEDICAID

## 2020-12-21 DIAGNOSIS — R41.841 COGNITIVE COMMUNICATION DISORDER: ICD-10-CM

## 2020-12-21 DIAGNOSIS — Z74.09 IMPAIRED MOBILITY: ICD-10-CM

## 2020-12-21 PROCEDURE — 97110 THERAPEUTIC EXERCISES: CPT | Mod: PN

## 2020-12-21 PROCEDURE — 92507 TX SP LANG VOICE COMM INDIV: CPT | Mod: PN

## 2020-12-21 NOTE — PROGRESS NOTES
"Outpatient Neurological Rehabilitation   Speech and Language Therapy Treatment Note  Date:  12/21/2020     Name: Froy Zuniga   MRN: [id number]   Therapy Diagnosis:   Encounter Diagnosis   Name Primary?    Cognitive communication disorder    Physician: Jeffrey Zacarias*  Physician Orders: Ambulatory Referral to Speech Therapy  Medical Diagnosis: Injury of cervical spinal cord    Visit #/ Visits Authorized: 4 / 12  Date of Evaluation:  12/4/2020  Insurance Authorization Period: 12/8/2020-4/26/2020  Plan of Care Expiration Date:    2/26/2021  Extended POC:  n/a   Progress Note: due 1/4/2021   Visits Cancelled: 0  Visits No Show: 0    Time In:  1430  Time Out:  1510  Total Billable Time: 40    Precautions: Standard, Fall and Cervical Precautions  Subjective:   Pt reports: "I did a lot in PT."   He was compliant to home exercise program.   Response to previous treatment: None indicated  Pain Scale:  0/10 on VAS currently.   Pain Location: n/a  Objective:   UNTIMED  Procedure Min.   Speech- Language- Voice Therapy  40   Total Timed Units: 0  Total Untimed Units: 1  Charges Billed/# of units: 1    Short Term Goals: (8 weeks) Current Progress:   Pt will recall 4 memory strategies independently 3 times overall.    Progressing/ Not Met 12/21/2020   Not addressed in today's session.    Pt will recall details from a paragraph presented verbally with 80% accuracy independently immediately following presentation.     Progressing/ Not Met 12/21/2020   Not addressed in today's session.    Pt will complete moderate level problem solving tasks with minimal cues for accurate completion.     Progressing/ Not Met 12/21/2020   Pt completed a moderate level planning and organizing task with moderate to max cues overall. Significantly extended time required.     Met x1   Pt will complete moderate level visual attention tasks tasks with minimal cues for accurate completion.     Progressing/ Not Met 12/21/2020   Not addressed in " today's session.    Pt will sort given medications with 90% accuracy given minimal cues.     Progressing/ Not Met 12/21/2020   Not addressed in today's session.   Met x1   Pt will complete goal plan action review with 90% accuracy independently.      Progressing/ Not Met 12/21/2020   Not addressed in today's session.       Patient Education/Response:   Pt educated on taking on more responsibility at home to initiate more. Pt verbalized understanding. Understanding questionable.    Written Home Exercises Provided: Patient instructed to cont prior HEP.  Exercises were reviewed and Froy was able to demonstrate them prior to the end of the session.  Froy demonstrated good  understanding of the education provided.     See EMR under Patient Instructions for exercises provided 12/10/2020.  Assessment:   Froy is progressing well towards his goals. Attention and task maintaince deficits noted throughout the session. Multiple redirection to task needed. Pt was unable to be redirected to task by the end of the session. Pt required cues to assess for  Goals to be updated as necessary.     Pt prognosis is Good. Pt will continue to benefit from skilled outpatient speech and language therapy to address the deficits listed in the problem list on initial evaluation, provide pt/family education and to maximize pt's level of independence in the home and community environment.   Medical necessity is demonstrated by the following IMPAIRMENTS:  Deficits in executive functioning, attention, and memory prevent the pt from relaying medically and safety relevant information in a timely manner in a state of emergency.  Barriers to Therapy: none identified  Pt's spiritual, cultural and educational needs considered and pt agreeable to plan of care and goals.  Plan:   Continue POC with focus on rehabilitation and compensation for multiple domains of cognition.     Clementine Davis CCC-SLP   12/21/2020

## 2020-12-21 NOTE — PROGRESS NOTES
Physical Therapy Treatment Note     Name: Froy Zuniga  Clinic Number: [id number]    Therapy Diagnosis:   Encounter Diagnosis   Name Primary?    Impaired mobility      Physician: Jeffrey Zacarias MD    Visit Date: 12/21/2020    Physician Orders: eval and treat  Medical Diagnosis: Injury of cervical spinal cord  Date of Evaluation:  12/4/2020   Insurance Authorization Period: 11/25/2021  Plan of Care Certification:    12/4/2020 to 2/26/2021  Visit # / Visits Authorized:  5/ 12     Time In: 1345  Time Out: 1430  Total Billable Time: 45 minutes    Precautions: Standard and Fall, SCI, cervical collar    Subjective     Pt reports no report of pain  : He was compliant with home exercise program given to him per rehab, which consisted of clamshells with RTB. .  Response to previous treatment: tolerated Rx.  Functional change: wears no collar this visit.    Pain: 0/10  Location: n/a     Objective     Froy received therapeutic exercises to develop strength, endurance, ROM, flexibility, posture and core stabilization for 45 minutes including:  Hamstring/heelcord stretches  Bridging 20  SAQ's manual resist to L 20 each  SLR 4#s cuff weights to Left 20 each.  Crooklying hip abduction/.adduction manual resist 20/20  Sit to stand 20  Weight shifts/side to side sway 20  Mini squat 20  GT with HHA 10 ft x 2.    Home Exercises Provided and Patient Education Provided     Education provided:   - Educated on importance of right side weight bearing to inhibit muscle spasm.    Written Home Exercises Provided: yes.  Exercises were reviewed and Froy was able to demonstrate them prior to the end of the session.  Froy demonstrated good  understanding of the education provided. Son verbalizes understanding.    See EMR under Patient Instructions for exercises provided 12/8/2020.    Assessment     Patient  Has right knee on slight flexion during standing activity. Fatigues more quickly for ambulation.  Tolerated Rx well.    Froy is  progressing well towards his goals.   Pt prognosis is Good/fair.     Pt will continue to benefit from skilled outpatient physical therapy to address the deficits listed in the problem list box on initial evaluation, provide pt/family education and to maximize pt's level of independence in the home and community environment.     Pt's spiritual, cultural and educational needs considered and pt agreeable to plan of care and goals.     Anticipated barriers to physical therapy: no    Goals: (ongoing)  1.Decrease pain x 1 grade.  2.Start HEP.  3.ROM of BLE WFL.  4.Strength 3+/5  5.Pt will ambulate 100' with AD with CGA.    Plan     Pasquale walker  Continue POC    Devin Lee, PT

## 2020-12-22 ENCOUNTER — CLINICAL SUPPORT (OUTPATIENT)
Dept: REHABILITATION | Facility: HOSPITAL | Age: 40
End: 2020-12-22
Payer: MEDICAID

## 2020-12-22 DIAGNOSIS — Z74.09 IMPAIRED MOBILITY AND ADLS: Primary | ICD-10-CM

## 2020-12-22 DIAGNOSIS — Z78.9 IMPAIRED MOBILITY AND ADLS: Primary | ICD-10-CM

## 2020-12-22 DIAGNOSIS — R68.89 IMPAIRED FUNCTION OF UPPER EXTREMITY: ICD-10-CM

## 2020-12-22 DIAGNOSIS — R29.898 DECREASED GRIP STRENGTH: ICD-10-CM

## 2020-12-22 DIAGNOSIS — R27.8 COORDINATION IMPAIRMENT: ICD-10-CM

## 2020-12-22 DIAGNOSIS — R41.841 COGNITIVE COMMUNICATION DISORDER: ICD-10-CM

## 2020-12-22 PROCEDURE — 97530 THERAPEUTIC ACTIVITIES: CPT | Mod: PN,59

## 2020-12-22 PROCEDURE — 92507 TX SP LANG VOICE COMM INDIV: CPT | Mod: PN

## 2020-12-22 NOTE — PROGRESS NOTES
"  Outpatient Neurological Rehabilitation   Speech and Language Therapy Treatment Note  Date:  12/22/2020     Name: Froy Zuniga   MRN: [id number]   Therapy Diagnosis:   Encounter Diagnosis   Name Primary?    Cognitive communication disorder    Physician: Jeffrey Zacarias*  Physician Orders: Ambulatory Referral to Speech Therapy  Medical Diagnosis: Injury of cervical spinal cord    Visit #/ Visits Authorized: 4 / 12  Date of Evaluation:  12/4/2020  Insurance Authorization Period: 12/8/2020-4/26/2020  Plan of Care Expiration Date:    2/26/2021  Extended POC:  n/a   Progress Note: due 1/4/2021   Visits Cancelled: 0  Visits No Show: 0    Time In:  1515  Time Out:  1600  Total Billable Time: 45    Precautions: Standard, Fall and Cervical Precautions  Subjective:   Pt reports: "I did a lot in PT."   He was compliant to home exercise program.   Response to previous treatment: None indicated  Pain Scale:  0/10 on VAS currently.   Pain Location: n/a  Objective:   UNTIMED  Procedure Min.   Speech- Language- Voice Therapy  45   Total Timed Units: 0  Total Untimed Units: 1  Charges Billed/# of units: 1    Short Term Goals: (8 weeks) Current Progress:   Pt will recall 4 memory strategies independently 3 times overall.    Progressing/ Not Met 12/22/2020   Not addressed in today's session.    Pt will recall details from a paragraph presented verbally with 80% accuracy independently immediately following presentation.     Progressing/ Not Met 12/22/2020   Pt recalled details from a moderate length paragraph presented verbally with 80% accuracy independently      Met x1   Pt will complete moderate level problem solving tasks with minimal cues for accurate completion.     Progressing/ Not Met 12/22/2020   Pt completed a moderate level problem solving task with calendars and planning with moderate cues overall. Significantly extended time required.     Met x1   Pt will complete moderate level visual attention tasks tasks with " minimal cues for accurate completion.     Progressing/ Not Met 12/22/2020   Pt completed a moderate level visual aternating attention task with minimal cues for strategy initiation.    Met x1   Pt will sort given medications with 90% accuracy given minimal cues.     Progressing/ Not Met 12/22/2020   Not addressed in today's session.   Met x1   Pt will complete goal plan action review with 90% accuracy independently.      Progressing/ Not Met 12/22/2020   Not addressed in today's session.       Patient Education/Response:   Pt's family was educated on allowing the patient to work through more tasks prior to given assistance if needed.     Written Home Exercises Provided: Patient instructed to cont prior HEP.  Exercises were reviewed and Froy was able to demonstrate them prior to the end of the session.  Froy demonstrated good  understanding of the education provided.     See EMR under Patient Instructions for exercises provided 12/10/2020.  Assessment:   Froy is progressing well towards his goals. Attention to task better this session. Pt's family in the session. Attention to detail in task appears to be more difficult with family in the room; however, that is the patient's normal environment. Goals to be updated as necessary.     Pt prognosis is Good. Pt will continue to benefit from skilled outpatient speech and language therapy to address the deficits listed in the problem list on initial evaluation, provide pt/family education and to maximize pt's level of independence in the home and community environment.   Medical necessity is demonstrated by the following IMPAIRMENTS:  Deficits in executive functioning, attention, and memory prevent the pt from relaying medically and safety relevant information in a timely manner in a state of emergency.  Barriers to Therapy: none identified  Pt's spiritual, cultural and educational needs considered and pt agreeable to plan of care and goals.  Plan:   Continue POC with focus  on rehabilitation and compensation for multiple domains of cognition.     Clementine Davis CCC-SLP   12/22/2020

## 2020-12-22 NOTE — PROGRESS NOTES
"  Occupational Therapy Treatment Note     Date: 12/22/2020  Name: Froy Zuniga  Clinic Number: [id number]    Therapy Diagnosis:   Encounter Diagnoses   Name Primary?    Impaired mobility and ADLs Yes    Decreased  strength     Coordination impairment     Impaired function of upper extremity      Physician: Jeffrey Zacarias*    Physician Orders: OT eval & Treat; neuro program  Medical Diagnosis: S14.109S (ICD-10-CM) - Injury of cervical spinal cord, sequela  Evaluation Date: 11/27/2020  Plan of Care Expiration Period: 2/19/2020  Insurance Authorization period Expiration: 12/31/2020  Date of Return to MD: tba  Visit # / Visits Authorized: 4/ 12    Time In: 1448  Time Out: 1516  Total Billable Time: 28 minutes    Precautions:  Standard, blood thinners and Fall    Subjective     Pt reports: he had help with his shirt and jacket today. getting his right shoe on today and tying both shoes   he was not compliant with home exercise program given last session, but did bring the yellow folder today.     During session, OT realized that pt did not have cervical brace on. Froy noted that he isn't wearing it today because it was bothering his beard.     "It's hard waking up every day like this."    Hasn't taken robaxin or gabapentin since yesterday as he ran out and medicaid isn't currently covering - Ally went to get them with cash today.    He is taking tylenol; he denies use of illicit substances to manage pain.    Response to previous treatment: no concerns  Functional change: none noted by pt    Pain: 5/10  Location:  "from chest on down."    Objective     Froy participated in dynamic functional therapeutic activities to improve functional performance for 40 minutes, including:    W/c<>mat squat-pivot t/f x3 in each direction w/ SBA, min cues for R hand placement. All movements appeared safe and calculated until at end of session when he "plopped" back into the wheelchair.  Scooting at EOM w/ SBA. He was " encouraged to place the R hand to the R quad to encourage active use of the R UE, as well as to facilitate the R quad.  Doffed hat and mask w/ mod I  Doffed jacket w/ min A for pulling out the L UE.  Doffed t-shirt w/ increased time and supervision.  Donned t-shirt w/ significantly increased time, supervision, encouragement and visual feedback from mirror.  Donned jacket w/ mod A  Donned mask w/ supervision  Donned hand w/ min A.    Gentle mobilization of joints involving R UE phalanges, metacarpals, carpals, radius and ulna. Gentle stretching of the soft tissues of the R wrist and hand to decrease edema and improve PROM, potentially allowing for increases in active movement.     Home Exercises and Education Provided     Education provided:   - Progress towards goals   - ed pt & family that Froy needs to be encouraged to do as much as possible for himself within the limits of safety. Family is encouraged to help with specific challenges in a task, but not to complete tasks for pt. Specifically address UB dressing w/ father and sons today.  - ed to maximize fxl use of the R UE, incorporating it into as many activities as possible.  - ed re: OT questioning use of non-prescribed methods of pain management as it can affect his cognition - which doesn't seem consistent. Pt & family v/u.  -communication book entry.    Written Home Exercises Provided: Patient instructed to cont prior HEP.  Exercises were reviewed and Froy was able to demonstrate them prior to the end of the session.  Froy demonstrated good  understanding of the HEP provided.     See EMR under Patient Instructions for exercises provided prior visit.       Assessment     Pt would continue to benefit from skilled OT. Improved UB dressing today compared to previous attempt, though Froy reports receiving help at home, rather than working through the tasks. He also demo's improvement in transfer independence.     Froy is progressing well towards his goals and  there are no updates to goals at this time. Pt prognosis is Fair.     Pt will continue to benefit from skilled outpatient occupational therapy to address the deficits listed in the problem list on initial evaluation provide pt/family education and to maximize pt's level of independence in the home and community environment.     Anticipated barriers to occupational therapy: high level of injury, possibly transportation as pt is unable to drive    Pt's spiritual, cultural and educational needs considered and pt agreeable to plan of care and goals.    Goals:  Short Term Goals: 4 weeks   Pt will be indep w/ initial HEP. (progressing 12/22/2020)   Pt will be able to squeeze 3# with the R UE, 30# w/ the L UE. (MET for R UE 12/15/2020; progressing for L UE 12/22/2020)   UB dressing w/ Mod I after s/u. (progressing 12/22/2020)   Grooming w/ Mod I w/ all supplies in reach. (progressing 12/22/2020)      Long Term Goals: 12 weeks   Pt will be mod I w/ progressive strengthening HEP. (progressing 12/22/2020)   Pt will be mod I w/ UB dressing. (progressing 12/22/2020)   Pt will complete LB dressing w/ min A and AE PRN. (progressing 12/22/2020)   Erik UE strength will increase by at least 1/2 grade throughout the arm. (progressing 12/22/2020)   Erik UE coordination will improve, as evidenced by a Box & Block score of at least 10 on the R UE and 50 on the L UE. (progressing 12/22/2020)   Supervision or better toileting and t/f. (progressing 12/22/2020)   New Goal: Mod I bed mobility. (progressing 12/22/2020)   Goals for [facility] to be set as appropriate.    Plan   Certification Period/Plan of care expiration: 11/27/2020 to 2/19/2021.     Outpatient Occupational Therapy 2 times weekly for 12 weeks to include the following interventions: Manual Therapy, Neuromuscular Re-ed, Orthotic Management and Training, Patient Education, Self Care, Therapeutic Activities, Therapeutic Taping, and Therapeutic Exercise.    Updates/Grading for next  session: LB dressing?    Maribeth Lima, OT

## 2020-12-29 ENCOUNTER — CLINICAL SUPPORT (OUTPATIENT)
Dept: REHABILITATION | Facility: HOSPITAL | Age: 40
End: 2020-12-29
Payer: MEDICAID

## 2020-12-29 DIAGNOSIS — R29.898 DECREASED GRIP STRENGTH: ICD-10-CM

## 2020-12-29 DIAGNOSIS — R41.841 COGNITIVE COMMUNICATION DISORDER: Primary | ICD-10-CM

## 2020-12-29 DIAGNOSIS — R41.841 COGNITIVE COMMUNICATION DISORDER: ICD-10-CM

## 2020-12-29 DIAGNOSIS — Z74.09 IMPAIRED MOBILITY AND ADLS: Primary | ICD-10-CM

## 2020-12-29 DIAGNOSIS — Z74.09 IMPAIRED MOBILITY: ICD-10-CM

## 2020-12-29 DIAGNOSIS — R27.8 COORDINATION IMPAIRMENT: ICD-10-CM

## 2020-12-29 DIAGNOSIS — R68.89 IMPAIRED FUNCTION OF UPPER EXTREMITY: ICD-10-CM

## 2020-12-29 DIAGNOSIS — Z78.9 IMPAIRED MOBILITY AND ADLS: Primary | ICD-10-CM

## 2020-12-29 PROCEDURE — 97530 THERAPEUTIC ACTIVITIES: CPT | Mod: PN

## 2020-12-29 PROCEDURE — 92507 TX SP LANG VOICE COMM INDIV: CPT | Mod: PN

## 2020-12-29 PROCEDURE — 97110 THERAPEUTIC EXERCISES: CPT | Mod: PN,CQ

## 2020-12-29 NOTE — PROGRESS NOTES
"  Occupational Therapy Treatment Note  & Monthly Status Update     Date: 12/29/2020  Name: Froy Zuniga  Clinic Number: [id number]    Therapy Diagnosis:   Encounter Diagnoses   Name Primary?    Impaired mobility and ADLs Yes    Decreased  strength     Coordination impairment     Impaired function of upper extremity      Physician: Jeffrey Zacarias*    Physician Orders: OT eval & Treat; neuro program  Medical Diagnosis: S14.109S (ICD-10-CM) - Injury of cervical spinal cord, sequela  Evaluation Date: 11/27/2020  Plan of Care Expiration Period: 2/19/2020  Insurance Authorization period Expiration: 12/31/2020  Date of Return to MD: tba  Visit # / Visits Authorized: 5/ 12    Time In: 1442  Time Out: 1515  Total Billable Time: 30 minutes    Precautions:  Standard, blood thinners and Fall    Subjective     Pt reports: he initially reported "my daddy dressed me today." After much discussion, he noted that his father helped, but didn't do all of the task of dressing. When challenged, Froy stated, "I just can't do that right now."  He wants to work on core strength and "this hand"    he was not compliant with home exercise program given last session, but did bring the yellow folder today. He was also wearing his cervical collar. He went for f/u w/ the trauma MD last yesterday, but has to wait to see the neurosurgeon to find out if he can remove the cervical collar.    Response to previous treatment: no concerns  Functional change: "I can get to the bathroom by myself at night"    Pain: 5/10  Location:  "from chest on down."    Objective     Froy participated in dynamic functional therapeutic activities to improve functional performance for 40 minutes, including:    Squat-pivot t/f w/ SBA  R UE touching chin AROM 2x5  R hand to pocket x5, trying to reach behind back x3 (this was stopped due to c/o pinch in shoulder blade)  Sit<>supine w/ mod I and increased time, decreased efficiency.   Supine palms together " chest press 2x5  Supine reverse crunch, bringing knees to chest as able, 3x5  Supine L shoulder flexion AROM w/ CGA-SBA 3x5  Supine R shoulder flexion AAROM/ eccentric lowering w/ bivalve to allow pt to focus on shoulder and prevent compensatory movement at biceps 2x5.  Standing at EOM to hike pants w/ mod A (as they were too loose). He did not attempt to use the R UE to pull up pants.  AROM R sh flex=40  AROM R elbow [id number]  R =3#    Home Exercises and Education Provided     Education provided:   - Progress towards goals   - ed pt & family that Froy needs to be encouraged to do as much as possible for himself within the limits of safety. Family is encouraged to help with specific challenges in a task, but not to complete tasks for pt. Specifically address UB dressing w/ father and sons today.  - ed to maximize fxl use of the R UE, incorporating it into as many activities as possible.  -communication book entry.    Written Home Exercises Provided: Patient instructed to cont prior HEP.  Exercises were reviewed and Froy was able to demonstrate them prior to the end of the session.  Froy demonstrated good  understanding of the HEP provided.     See EMR under Patient Instructions for exercises provided prior visit.       Assessment     Pt would continue to benefit from skilled OT. He has met 1 STG and is progressing toward other goals, especially for bed mobility. He continues to report getting help at home for dressing and is unable to state just how much help he's getting and how much of a task he is doing. Transfers continue to improve.    Froy is progressing well towards his goals and there are no updates to goals at this time. Pt prognosis is Fair.     Pt will continue to benefit from skilled outpatient occupational therapy to address the deficits listed in the problem list on initial evaluation provide pt/family education and to maximize pt's level of independence in the home and community environment.      Anticipated barriers to occupational therapy: high level of injury, possibly transportation as pt is unable to drive    Pt's spiritual, cultural and educational needs considered and pt agreeable to plan of care and goals.    Goals:  Short Term Goals: 4 weeks   Pt will be indep w/ initial HEP. (progressing 12/29/2020)   Pt will be able to squeeze 3# with the R UE, 30# w/ the L UE. (MET for R UE 12/15/2020; progressing for L UE 12/29/2020)   UB dressing w/ Mod I after s/u. (progressing 12/29/2020)   Grooming w/ Mod I w/ all supplies in reach. (progressing 12/29/2020)      Long Term Goals: 12 weeks   Pt will be mod I w/ progressive strengthening HEP. (progressing 12/29/2020)   Pt will be mod I w/ UB dressing. (progressing 12/29/2020)   Pt will complete LB dressing w/ min A and AE PRN. (progressing 12/29/2020)   Erik UE strength will increase by at least 1/2 grade throughout the arm. (progressing 12/29/2020)   Erik UE coordination will improve, as evidenced by a Box & Block score of at least 10 on the R UE and 50 on the L UE. (progressing 12/29/2020)   Supervision or better toileting and t/f. (progressing 12/29/2020)   New Goal: Mod I bed mobility. (progressing 12/29/2020)   Goals for [facility] to be set as appropriate.    Plan   Certification Period/Plan of care expiration: 11/27/2020 to 2/19/2021.     Outpatient Occupational Therapy 2 times weekly for 12 weeks to include the following interventions: Manual Therapy, Neuromuscular Re-ed, Orthotic Management and Training, Patient Education, Self Care, Therapeutic Activities, Therapeutic Taping, and Therapeutic Exercise.    Updates/Grading for next session: review exs from previous tx; work on R UE strength and coordination    Maribeth Lima OT

## 2020-12-29 NOTE — PROGRESS NOTES
"  Outpatient Neurological Rehabilitation   Speech and Language Therapy Treatment Note  Date:  12/29/2020     Name: Froy Zuniga   MRN: [id number]   Therapy Diagnosis:   Encounter Diagnosis   Name Primary?    Cognitive communication disorder Yes   Physician: Jeffrey Zacarias*  Physician Orders: Ambulatory Referral to Speech Therapy  Medical Diagnosis: Injury of cervical spinal cord    Visit #/ Visits Authorized: 6 / 12  Date of Evaluation:  12/4/2020  Insurance Authorization Period: 12/8/2020-4/26/2020  Plan of Care Expiration Date:    2/26/2021  Extended POC:  n/a   Progress Note: due 1/4/2021   Visits Cancelled: 0  Visits No Show: 0    Time In:  1515  Time Out:  1600  Total Billable Time: 45    Precautions: Standard, Fall and Cervical Precautions  Subjective:   Pt reports: "I brought my son today to see what I do here."   He was compliant to home exercise program.   Response to previous treatment: "im doing more at home'  Pain Scale:  0/10 on VAS currently.   Pain Location: n/a  Objective:   UNTIMED  Procedure Min.   Speech- Language- Voice Therapy  45   Total Timed Units: 0  Total Untimed Units: 1  Charges Billed/# of units: 1    Short Term Goals: (8 weeks) Current Progress:   Pt will recall 4 memory strategies independently 3 times overall.    Progressing/ Not Met 12/29/2020   Not addressed in today's session.    Pt will recall details from a paragraph presented verbally with 80% accuracy independently immediately following presentation.     Progressing/ Not Met 12/29/2020   Pt recalled details from a moderate length paragraph presented verbally with 80% accuracy independently      Met x1   Pt will complete moderate level problem solving tasks with minimal cues for accurate completion.     Progressing/ Not Met 12/29/2020   Pt completed a moderate level problem solving task with money manipulation with minimal cues overall. Significantly extended time required.     Met x2   Pt will complete moderate level " visual attention tasks with minimal cues for accurate completion.     Progressing/ Not Met 12/29/2020   Pt completed a moderate level visual aternating attention task on the iPad with 80% accuracy independently, minimal cues overall for strategy initiation. Disorganized scanning pattern present.    Met x1   Pt will sort given medications with 90% accuracy given minimal cues.     Progressing/ Not Met 12/29/2020   Not addressed in today's session.   Met x1   Pt will complete goal plan action review with 90% accuracy independently.      Progressing/ Not Met 12/29/2020   Not addressed in today's session.       Patient Education/Response:   Pt's family was educated on only giving assistance when needed.      Written Home Exercises Provided: Patient instructed to cont prior HEP.  Exercises were reviewed and Froy was able to demonstrate them prior to the end of the session.  Froy demonstrated good  understanding of the education provided.     See EMR under Patient Instructions for exercises provided 12/10/2020.  Assessment:   Froy is progressing well towards his goals. Better awareness and engagement in the session. Higher level tasks attempted. Pt has good family support; however, family educated on need to allow the patient to do more on his own. Goals to be updated as necessary.     Pt prognosis is Good. Pt will continue to benefit from skilled outpatient speech and language therapy to address the deficits listed in the problem list on initial evaluation, provide pt/family education and to maximize pt's level of independence in the home and community environment.   Medical necessity is demonstrated by the following IMPAIRMENTS:  Deficits in executive functioning, attention, and memory prevent the pt from relaying medically and safety relevant information in a timely manner in a state of emergency.  Barriers to Therapy: none identified  Pt's spiritual, cultural and educational needs considered and pt agreeable to plan of  care and goals.  Plan:   Continue POC with focus on rehabilitation and compensation for multiple domains of cognition.     Clementine Davis CCC-SLP   12/29/2020

## 2020-12-30 ENCOUNTER — CLINICAL SUPPORT (OUTPATIENT)
Dept: REHABILITATION | Facility: HOSPITAL | Age: 40
End: 2020-12-30
Payer: MEDICAID

## 2020-12-30 DIAGNOSIS — R29.898 DECREASED GRIP STRENGTH: ICD-10-CM

## 2020-12-30 DIAGNOSIS — Z78.9 IMPAIRED MOBILITY AND ADLS: ICD-10-CM

## 2020-12-30 DIAGNOSIS — R27.8 COORDINATION IMPAIRMENT: ICD-10-CM

## 2020-12-30 DIAGNOSIS — Z74.09 IMPAIRED MOBILITY: ICD-10-CM

## 2020-12-30 DIAGNOSIS — R41.841 COGNITIVE COMMUNICATION DISORDER: ICD-10-CM

## 2020-12-30 DIAGNOSIS — R68.89 IMPAIRED FUNCTION OF UPPER EXTREMITY: ICD-10-CM

## 2020-12-30 DIAGNOSIS — Z74.09 IMPAIRED MOBILITY AND ADLS: ICD-10-CM

## 2020-12-30 PROCEDURE — 97530 THERAPEUTIC ACTIVITIES: CPT | Mod: PN,59

## 2020-12-30 PROCEDURE — 92507 TX SP LANG VOICE COMM INDIV: CPT | Mod: PN

## 2020-12-30 PROCEDURE — 97110 THERAPEUTIC EXERCISES: CPT | Mod: PN,59

## 2020-12-30 NOTE — PROGRESS NOTES
"  Outpatient Neurological Rehabilitation   Speech and Language Therapy Treatment Note  Date:  12/30/2020     Name: Froy Zuniga   MRN: [id number]   Therapy Diagnosis:   Encounter Diagnosis   Name Primary?    Cognitive communication disorder    Physician: Jeffrey Zacarias*  Physician Orders: Ambulatory Referral to Speech Therapy  Medical Diagnosis: Injury of cervical spinal cord    Visit #/ Visits Authorized: 7 / 12  Date of Evaluation:  12/4/2020  Insurance Authorization Period: 12/8/2020-4/26/2020  Plan of Care Expiration Date:    2/26/2021  Extended POC:  n/a   Progress Note: due 1/4/2021   Visits Cancelled: 0  Visits No Show: 0    Time In:  1415  Time Out:  1500  Total Billable Time: 45    Precautions: Standard, Fall and Cervical Precautions  Subjective:   Pt reports: "I think Im ready to drive."   He was compliant to home exercise program.   Response to previous treatment: Better attention noted  Pain Scale:  0/10 on VAS currently.   Pain Location: n/a  Objective:   UNTIMED  Procedure Min.   Speech- Language- Voice Therapy  45   Total Timed Units: 0  Total Untimed Units: 1  Charges Billed/# of units: 1    Short Term Goals: (8 weeks) Current Progress:   Pt will recall 4 memory strategies independently 3 times overall.    Progressing/ Not Met 12/30/2020   Not addressed in today's session.    Pt will recall details from a paragraph presented verbally with 80% accuracy independently immediately following presentation.     Progressing/ Not Met 12/30/2020   Pt recalled details from a moderate length paragraph presented verbally with 80% accuracy independently      Met x1   Pt will complete moderate level problem solving tasks with minimal cues for accurate completion.     Progressing/ Not Met 12/30/2020   Pt completed a moderate level problem solving task with money manipulation with minimal cues overall. Significantly extended time required.     Met x2   Pt will complete moderate level visual attention tasks " with minimal cues for accurate completion.     Progressing/ Not Met 12/30/2020   Pt completed a moderate level visual aternating attention task on the iPad with 80% accuracy independently, minimal cues overall for strategy initiation. Disorganized scanning pattern present.    Met x1   Pt will sort given medications with 90% accuracy given minimal cues.     Progressing/ Not Met 12/30/2020   Not addressed in today's session.   Met x1   Pt will complete goal plan action review with 90% accuracy independently.      Progressing/ Not Met 12/30/2020   Not addressed in today's session.       Patient Education/Response:   Pt was educated on attention and problem solving's role in driving. Pt verbalized understanding    Written Home Exercises Provided: Patient instructed to cont prior HEP.  Exercises were reviewed and Froy was able to demonstrate them prior to the end of the session.  Froy demonstrated good  understanding of the education provided.     See EMR under Patient Instructions for exercises provided 12/10/2020.  Assessment:   Froy is progressing well towards his goals. Deficits in functional problem solving noted as patient states he feels he is almost ready to drive. Good participation in the session.  Goals to be updated as necessary.     Pt prognosis is Good. Pt will continue to benefit from skilled outpatient speech and language therapy to address the deficits listed in the problem list on initial evaluation, provide pt/family education and to maximize pt's level of independence in the home and community environment.   Medical necessity is demonstrated by the following IMPAIRMENTS:  Deficits in executive functioning, attention, and memory prevent the pt from relaying medically and safety relevant information in a timely manner in a state of emergency.  Barriers to Therapy: none identified  Pt's spiritual, cultural and educational needs considered and pt agreeable to plan of care and goals.  Plan:   Continue POC  with focus on rehabilitation and compensation for multiple domains of cognition.     Clementine Davis CCC-SLP   12/30/2020

## 2020-12-30 NOTE — PROGRESS NOTES
Physical Therapy Treatment Note     Name: Froy Zuniga  Clinic Number: [id number]    Therapy Diagnosis:   Encounter Diagnosis   Name Primary?    Cognitive communication disorder      Physician: Jeffrey Zacarias*    Visit Date: 12/29/2020    Physician Orders: PT Eval and Treat neuro program.  Medical Diagnosis from Referral: Injury of cervical spinal cord,sequela.  Evaluation Date: 12/1/2020  Authorization Period Expiration: 12/30/20  Plan of Care Expiration: 3/1/21  Visit # / Visits authorized: 6/12    Time In: 1605  Time Out: 1700  Total Billable Time: 55 minutes    Precautions: Standard and Fall, SCI, cervical collar    Subjective     Pt reports: no pain at present. Wearing abdominal binder and cervical collar. Wearing KATT hose. Via manual w/c with son, Ray..   He was compliant with home exercise program. .  Response to previous treatment: no soreness  Functional change: none stated    Pain: 0/10  Location: n/a     Objective     BP after first trial of gait 132/88, HR 59    Froy received therapeutic exercises to develop strength, endurance, ROM, flexibility, posture and core stabilization for 40 minutes including:    Supine 10/2:   SLR    HS   Bridges with A at hips and knees for positioning   PROM B LE's all planes: hips, knees, and ankles   AP B 10/2   Lino's knee-shin-ankle touches 5/5 L/R    Froy participated in dynamic functional therapeutic activities to improve functional performance for 15 minutes, including:    Transfers w/c-mat x 2 with CGA  Sit-supine with CGA/min A and VC for technique     Gait training x 15 minutes: 36' x 2 with rw and mod A of 2, with w/c follow, and VC for R knee ext with stance and tc for R hand . VC for proper distance within rw and improved posture.     Home Exercises Provided and Patient Education Provided     Education provided:   - Educated pt that he/she may feel soreness after session.    _instructed pt to wear KATT hose and abdominal binder for all  sessions  - Reviewed s/s AD with pt and family    Written Home Exercises Provided: Patient instructed to cont prior HEP.  Exercises were reviewed and Froy was able to demonstrate them prior to the end of the session.  Froy demonstrated good  understanding of the education provided. Son verbalizes understanding.    See EMR under Patient Instructions for exercises provided 12/8/2020.    Assessment     Progressing with gait training to rw and increased gait distance, although requiring mod of 2 people with w/c follow. VC for increased step widths.     Froy is progressing well towards his goals.   Pt prognosis is Good/fair.     Pt will continue to benefit from skilled outpatient physical therapy to address the deficits listed in the problem list box on initial evaluation, provide pt/family education and to maximize pt's level of independence in the home and community environment.     Pt's spiritual, cultural and educational needs considered and pt agreeable to plan of care and goals.     Anticipated barriers to physical therapy: no    Goals:   Short Term Goals: 6 weeks (ongoing)  1.Decrease pain x 1 grade.  2.Start HEP. (initiated)  3.ROM of BLE WFL. (ongoing)  4.Strength 3+/5 (ongoing)  5.Pt will ambulate 100' with AD with CGA. (Progressing)    Long Term Goals: 12 weeks   1.Pain 0-4/10.  2.Independent HEP.  3.Independent transfers.  4.ROM BLE WFL/WNL.  5.Strength BLE 5/5.  6.Pt will ambulate 500' with AD with CGA.    Plan     Continue per POC, progressing as appropriate, working on strengthening, flexibility, transfers, and gait.     Danielle Wan, PTA

## 2020-12-30 NOTE — PROGRESS NOTES
Occupational Therapy Treatment Note     Date: 12/30/2020  Name: Froy Zuniga  Clinic Number: [id number]    Therapy Diagnosis:   Encounter Diagnoses   Name Primary?    Impaired mobility and ADLs     Decreased  strength     Coordination impairment     Impaired function of upper extremity      Physician: Jeffrey Zacarias*    Physician Orders: OT eval & Treat; neuro program  Medical Diagnosis: S14.109S (ICD-10-CM) - Injury of cervical spinal cord, sequela  Evaluation Date: 11/27/2020  Plan of Care Expiration Period: 2/19/2020  Insurance Authorization period Expiration: 12/31/2020  Date of Return to MD: tba  Visit # / Visits Authorized: 6/12    Time In: 1500  Time Out: 1545  Total Billable Time: 45 minutes    Precautions:  Standard, blood thinners and Fall    Subjective     Pt reports: Pt had help with his sweatshirt and getting his shoes on today.   He was not compliant with home exercise program given last session, but did bring the yellow folder today.   Response to previous treatment: no concerns  Functional change: none noted by pt    Pain: 0/10    Objective     Froy participated in dynamic functional therapeutic activities to improve functional performance for 40 minutes, including:    -UBE 5 minutes forward/5 minutes backward 1.0 level to improve his R UE ROM and strength   R hand supported grasp on handle with wrap  W/c<>mat squat-pivot t/f x2 in each direction w/ SBA  -Scooting at EOM w/ SBA  Verbal cues for R hand to the R quad to encourage active use of the R UE  -R shoulder IR 2x5  supine  Sit<>supine w/ mod I and increased time  -R UE AAROM to improve ROM and functional use  Supine palms together chest press 2x5  Supine R shoulder flexion AAROM 2x10  AROM R elbow flexion/extension 2x10    Home Exercises and Education Provided     Education provided:   - Progress towards goals   - ed to maximize fxl use of the R UE, incorporating it into as many activities as possible.  -communication book  entry.    Written Home Exercises Provided: Patient instructed to cont prior HEP.  Exercises were reviewed and Froy was able to demonstrate them prior to the end of the session.  Froy demonstrated good  understanding of the HEP provided.     See EMR under Patient Instructions for exercises provided prior visit.     Assessment     Pt would continue to benefit from skilled OT. He also demonstrated improved squat transfer to his L side.     Froy is progressing well towards his goals and there are no updates to goals at this time. Pt prognosis is Fair.     Pt will continue to benefit from skilled outpatient occupational therapy to address the deficits listed in the problem list on initial evaluation provide pt/family education and to maximize pt's level of independence in the home and community environment.     Anticipated barriers to occupational therapy: high level of injury, possibly transportation as pt is unable to drive    Pt's spiritual, cultural and educational needs considered and pt agreeable to plan of care and goals.    Goals:  Short Term Goals: 4 weeks   Pt will be indep w/ initial HEP. (progressing 12/30/2020)   Pt will be able to squeeze 3# with the R UE, 30# w/ the L UE. (MET for R UE 12/15/2020; progressing for L UE 12/30/2020)   UB dressing w/ Mod I after s/u. (progressing 12/30/2020)   Grooming w/ Mod I w/ all supplies in reach. (progressing 12/30/2020)      Long Term Goals: 12 weeks   Pt will be mod I w/ progressive strengthening HEP. (progressing 12/30/2020)   Pt will be mod I w/ UB dressing. (progressing 12/30/2020)   Pt will complete LB dressing w/ min A and AE PRN. (progressing 12/30/2020)   Erik UE strength will increase by at least 1/2 grade throughout the arm. (progressing 12/30/2020)   Erik UE coordination will improve, as evidenced by a Box & Block score of at least 10 on the R UE and 50 on the L UE. (progressing 12/30/2020)   Supervision or better toileting and t/f. (progressing 12/30/2020)    New Goal: Mod I bed mobility. (progressing 12/30/2020)   Goals for [facility] to be set as appropriate.    Plan   Certification Period/Plan of care expiration: 11/27/2020 to 2/19/2021.     Outpatient Occupational Therapy 2 times weekly for 12 weeks to include the following interventions: Manual Therapy, Neuromuscular Re-ed, Orthotic Management and Training, Patient Education, Self Care, Therapeutic Activities, Therapeutic Taping, and Therapeutic Exercise.    Updates/Grading for next session: LB dressing    CRISTINE Brice

## 2020-12-30 NOTE — PROGRESS NOTES
Physical Therapy Treatment Note     Name: Froy Zuniga  Clinic Number: [id number]    Therapy Diagnosis:   Encounter Diagnosis   Name Primary?    Impaired mobility      Physician: Jeffrey Zacarias MD    Visit Date: 12/30/2020    Physician Orders: eval and treat  Medical Diagnosis: Injury of cervical spinal cord  Date of Evaluation:  12/4/2020   Insurance Authorization Period: 11/25/2021  Plan of Care Certification:    12/4/2020 to 2/26/2021  Visit # / Visits Authorized:  5/ 12     Time In: 1345  Time Out: 1430  Total Billable Time: 45 minutes    Precautions: Standard and Fall, SCI, cervical collar    Subjective     Pt reports no report of pain  : He was compliant with home exercise program given to him per rehab, which consisted of clamshells with RTB. .  Response to previous treatment: tolerated Rx.  Functional change: wears no collar this visit.    Pain: 0/10  Location: n/a     Objective     Froy received therapeutic exercises to develop strength, endurance, ROM, flexibility, posture and core stabilization for 45 minutes including:  Hamstring/heelcord stretches  Quad sets 20  Seated hamstring curls manual resist 20/20  Hip ABD manual resist 20;  ADD 20  Heel raises // bars 20  Mini squat 20  GT with RW  150ft x 2     Home Exercises Provided and Patient Education Provided     Education provided:   -Explained importance of posture for energy conservation and effective function    Written Home Exercises Provided: continue HEP from prior visit  Exercises were reviewed and Froy was able to demonstrate them prior to the end of the session.  Froy demonstrated good  understanding of the education provided. Son verbalizes understanding.    See EMR under Patient Instructions for exercises provided 12/8/2020.    Assessment     Patient  Has significant progress with handgrip and able to use RW better for gait. Improved gait skills and distance .  Tolerated 150 ft of gait distance consistent     Froy is progressing  well towards his goals.   Pt prognosis is Good/fair.     Pt will continue to benefit from skilled outpatient physical therapy to address the deficits listed in the problem list box on initial evaluation, provide pt/family education and to maximize pt's level of independence in the home and community environment.     Pt's spiritual, cultural and educational needs considered and pt agreeable to plan of care and goals.     Anticipated barriers to physical therapy: no    Goals: (ongoing)  1.Decrease pain x 1 grade.  2.Start HEP.  3.ROM of BLE WFL.  4.Strength 3+/5  5.Pt will ambulate 100' with AD with CGA.    Plan     RW gait  Continue POC    Devin Lee, PT

## 2021-01-04 ENCOUNTER — CLINICAL SUPPORT (OUTPATIENT)
Dept: REHABILITATION | Facility: HOSPITAL | Age: 41
End: 2021-01-04
Payer: MEDICAID

## 2021-01-04 DIAGNOSIS — R41.841 COGNITIVE COMMUNICATION DISORDER: ICD-10-CM

## 2021-01-04 DIAGNOSIS — Z78.9 IMPAIRED MOBILITY AND ADLS: Primary | ICD-10-CM

## 2021-01-04 DIAGNOSIS — R29.898 DECREASED GRIP STRENGTH: ICD-10-CM

## 2021-01-04 DIAGNOSIS — R27.8 COORDINATION IMPAIRMENT: ICD-10-CM

## 2021-01-04 DIAGNOSIS — Z74.09 IMPAIRED MOBILITY AND ADLS: Primary | ICD-10-CM

## 2021-01-04 DIAGNOSIS — Z74.09 IMPAIRED MOBILITY: ICD-10-CM

## 2021-01-04 DIAGNOSIS — R68.89 IMPAIRED FUNCTION OF UPPER EXTREMITY: ICD-10-CM

## 2021-01-04 PROCEDURE — 97116 GAIT TRAINING THERAPY: CPT | Mod: PN,CQ,59

## 2021-01-04 PROCEDURE — 97110 THERAPEUTIC EXERCISES: CPT | Mod: PN,CQ,59

## 2021-01-04 PROCEDURE — 92507 TX SP LANG VOICE COMM INDIV: CPT | Mod: PN

## 2021-01-04 PROCEDURE — 97530 THERAPEUTIC ACTIVITIES: CPT | Mod: PN

## 2021-01-06 ENCOUNTER — CLINICAL SUPPORT (OUTPATIENT)
Dept: REHABILITATION | Facility: HOSPITAL | Age: 41
End: 2021-01-06
Payer: MEDICAID

## 2021-01-06 DIAGNOSIS — Z74.09 IMPAIRED MOBILITY AND ADLS: Primary | ICD-10-CM

## 2021-01-06 DIAGNOSIS — Z78.9 IMPAIRED MOBILITY AND ADLS: Primary | ICD-10-CM

## 2021-01-06 DIAGNOSIS — R29.898 DECREASED GRIP STRENGTH: ICD-10-CM

## 2021-01-06 DIAGNOSIS — R27.8 COORDINATION IMPAIRMENT: ICD-10-CM

## 2021-01-06 DIAGNOSIS — R41.841 COGNITIVE COMMUNICATION DISORDER: ICD-10-CM

## 2021-01-06 DIAGNOSIS — R68.89 IMPAIRED FUNCTION OF UPPER EXTREMITY: ICD-10-CM

## 2021-01-06 DIAGNOSIS — Z74.09 IMPAIRED MOBILITY: ICD-10-CM

## 2021-01-06 PROCEDURE — 97110 THERAPEUTIC EXERCISES: CPT | Mod: PN,59

## 2021-01-06 PROCEDURE — 97530 THERAPEUTIC ACTIVITIES: CPT | Mod: PN

## 2021-01-06 PROCEDURE — 92507 TX SP LANG VOICE COMM INDIV: CPT | Mod: PN

## 2021-01-11 ENCOUNTER — CLINICAL SUPPORT (OUTPATIENT)
Dept: REHABILITATION | Facility: HOSPITAL | Age: 41
End: 2021-01-11
Payer: MEDICAID

## 2021-01-11 DIAGNOSIS — Z78.9 IMPAIRED MOBILITY AND ADLS: Primary | ICD-10-CM

## 2021-01-11 DIAGNOSIS — R68.89 IMPAIRED FUNCTION OF UPPER EXTREMITY: ICD-10-CM

## 2021-01-11 DIAGNOSIS — R29.898 DECREASED GRIP STRENGTH: ICD-10-CM

## 2021-01-11 DIAGNOSIS — R27.8 COORDINATION IMPAIRMENT: ICD-10-CM

## 2021-01-11 DIAGNOSIS — Z74.09 IMPAIRED MOBILITY: ICD-10-CM

## 2021-01-11 DIAGNOSIS — Z74.09 IMPAIRED MOBILITY AND ADLS: Primary | ICD-10-CM

## 2021-01-11 DIAGNOSIS — R41.841 COGNITIVE COMMUNICATION DISORDER: ICD-10-CM

## 2021-01-11 PROCEDURE — 97530 THERAPEUTIC ACTIVITIES: CPT | Mod: PN,59

## 2021-01-11 PROCEDURE — 92507 TX SP LANG VOICE COMM INDIV: CPT | Mod: PN

## 2021-01-11 PROCEDURE — 97110 THERAPEUTIC EXERCISES: CPT | Mod: PN,59

## 2021-01-13 ENCOUNTER — CLINICAL SUPPORT (OUTPATIENT)
Dept: REHABILITATION | Facility: HOSPITAL | Age: 41
End: 2021-01-13
Payer: MEDICAID

## 2021-01-13 DIAGNOSIS — R68.89 IMPAIRED FUNCTION OF UPPER EXTREMITY: ICD-10-CM

## 2021-01-13 DIAGNOSIS — R27.8 COORDINATION IMPAIRMENT: ICD-10-CM

## 2021-01-13 DIAGNOSIS — Z74.09 IMPAIRED MOBILITY: ICD-10-CM

## 2021-01-13 DIAGNOSIS — Z78.9 IMPAIRED MOBILITY AND ADLS: Primary | ICD-10-CM

## 2021-01-13 DIAGNOSIS — R29.898 DECREASED GRIP STRENGTH: ICD-10-CM

## 2021-01-13 DIAGNOSIS — R41.841 COGNITIVE COMMUNICATION DISORDER: ICD-10-CM

## 2021-01-13 DIAGNOSIS — Z74.09 IMPAIRED MOBILITY AND ADLS: Primary | ICD-10-CM

## 2021-01-13 PROCEDURE — 97530 THERAPEUTIC ACTIVITIES: CPT | Mod: PN,59

## 2021-01-13 PROCEDURE — 97110 THERAPEUTIC EXERCISES: CPT | Mod: PN

## 2021-01-13 PROCEDURE — 92507 TX SP LANG VOICE COMM INDIV: CPT | Mod: PN

## 2021-01-20 ENCOUNTER — CLINICAL SUPPORT (OUTPATIENT)
Dept: REHABILITATION | Facility: HOSPITAL | Age: 41
End: 2021-01-20
Payer: MEDICAID

## 2021-01-20 DIAGNOSIS — R27.8 COORDINATION IMPAIRMENT: ICD-10-CM

## 2021-01-20 DIAGNOSIS — R68.89 IMPAIRED FUNCTION OF UPPER EXTREMITY: ICD-10-CM

## 2021-01-20 DIAGNOSIS — Z78.9 IMPAIRED MOBILITY AND ADLS: ICD-10-CM

## 2021-01-20 DIAGNOSIS — Z74.09 IMPAIRED MOBILITY: ICD-10-CM

## 2021-01-20 DIAGNOSIS — R29.898 DECREASED GRIP STRENGTH: ICD-10-CM

## 2021-01-20 DIAGNOSIS — R41.841 COGNITIVE COMMUNICATION DISORDER: ICD-10-CM

## 2021-01-20 DIAGNOSIS — Z74.09 IMPAIRED MOBILITY AND ADLS: ICD-10-CM

## 2021-01-20 PROCEDURE — 97530 THERAPEUTIC ACTIVITIES: CPT | Mod: PN

## 2021-01-20 PROCEDURE — 97110 THERAPEUTIC EXERCISES: CPT | Mod: PN

## 2021-01-20 PROCEDURE — 92507 TX SP LANG VOICE COMM INDIV: CPT | Mod: PN

## 2021-01-25 ENCOUNTER — CLINICAL SUPPORT (OUTPATIENT)
Dept: REHABILITATION | Facility: HOSPITAL | Age: 41
End: 2021-01-25
Payer: MEDICAID

## 2021-01-25 DIAGNOSIS — R41.841 COGNITIVE COMMUNICATION DISORDER: ICD-10-CM

## 2021-01-25 DIAGNOSIS — Z78.9 IMPAIRED MOBILITY AND ADLS: Primary | ICD-10-CM

## 2021-01-25 DIAGNOSIS — R27.8 COORDINATION IMPAIRMENT: ICD-10-CM

## 2021-01-25 DIAGNOSIS — R29.898 DECREASED GRIP STRENGTH: ICD-10-CM

## 2021-01-25 DIAGNOSIS — Z74.09 IMPAIRED MOBILITY: ICD-10-CM

## 2021-01-25 DIAGNOSIS — Z74.09 IMPAIRED MOBILITY AND ADLS: Primary | ICD-10-CM

## 2021-01-25 DIAGNOSIS — R68.89 IMPAIRED FUNCTION OF UPPER EXTREMITY: ICD-10-CM

## 2021-01-25 PROCEDURE — 92507 TX SP LANG VOICE COMM INDIV: CPT | Mod: PN

## 2021-01-25 PROCEDURE — 97530 THERAPEUTIC ACTIVITIES: CPT | Mod: PN

## 2021-01-25 PROCEDURE — 97110 THERAPEUTIC EXERCISES: CPT | Mod: PN

## 2021-01-29 ENCOUNTER — TELEPHONE (OUTPATIENT)
Dept: REHABILITATION | Facility: HOSPITAL | Age: 41
End: 2021-01-29

## 2021-02-01 ENCOUNTER — TELEPHONE (OUTPATIENT)
Dept: REHABILITATION | Facility: HOSPITAL | Age: 41
End: 2021-02-01

## 2021-02-02 ENCOUNTER — DOCUMENTATION ONLY (OUTPATIENT)
Dept: REHABILITATION | Facility: HOSPITAL | Age: 41
End: 2021-02-02

## 2021-02-02 DIAGNOSIS — Z74.09 IMPAIRED MOBILITY AND ADLS: Primary | ICD-10-CM

## 2021-02-02 DIAGNOSIS — R68.89 IMPAIRED FUNCTION OF UPPER EXTREMITY: ICD-10-CM

## 2021-02-02 DIAGNOSIS — Z78.9 IMPAIRED MOBILITY AND ADLS: Primary | ICD-10-CM

## 2021-02-02 DIAGNOSIS — R27.8 COORDINATION IMPAIRMENT: ICD-10-CM

## 2021-02-02 DIAGNOSIS — R29.898 DECREASED GRIP STRENGTH: ICD-10-CM

## 2021-02-03 ENCOUNTER — CLINICAL SUPPORT (OUTPATIENT)
Dept: REHABILITATION | Facility: HOSPITAL | Age: 41
End: 2021-02-03
Payer: MEDICAID

## 2021-02-03 DIAGNOSIS — Z74.09 IMPAIRED MOBILITY: ICD-10-CM

## 2021-02-03 DIAGNOSIS — R68.89 IMPAIRED FUNCTION OF UPPER EXTREMITY: ICD-10-CM

## 2021-02-03 DIAGNOSIS — R29.898 DECREASED GRIP STRENGTH: ICD-10-CM

## 2021-02-03 DIAGNOSIS — Z78.9 IMPAIRED MOBILITY AND ADLS: Primary | ICD-10-CM

## 2021-02-03 DIAGNOSIS — Z74.09 IMPAIRED MOBILITY AND ADLS: Primary | ICD-10-CM

## 2021-02-03 DIAGNOSIS — R41.841 COGNITIVE COMMUNICATION DISORDER: ICD-10-CM

## 2021-02-03 DIAGNOSIS — R27.8 COORDINATION IMPAIRMENT: ICD-10-CM

## 2021-02-03 PROCEDURE — 97530 THERAPEUTIC ACTIVITIES: CPT | Mod: PN,59

## 2021-02-03 PROCEDURE — 97110 THERAPEUTIC EXERCISES: CPT | Mod: PN,CQ,59

## 2021-02-03 PROCEDURE — 92507 TX SP LANG VOICE COMM INDIV: CPT | Mod: PN

## 2021-02-08 ENCOUNTER — TELEPHONE (OUTPATIENT)
Dept: REHABILITATION | Facility: HOSPITAL | Age: 41
End: 2021-02-08

## 2021-02-10 ENCOUNTER — CLINICAL SUPPORT (OUTPATIENT)
Dept: REHABILITATION | Facility: HOSPITAL | Age: 41
End: 2021-02-10
Payer: MEDICAID

## 2021-02-10 DIAGNOSIS — R41.841 COGNITIVE COMMUNICATION DISORDER: ICD-10-CM

## 2021-02-10 DIAGNOSIS — Z78.9 IMPAIRED MOBILITY AND ADLS: Primary | ICD-10-CM

## 2021-02-10 DIAGNOSIS — R68.89 IMPAIRED FUNCTION OF UPPER EXTREMITY: ICD-10-CM

## 2021-02-10 DIAGNOSIS — R29.898 DECREASED GRIP STRENGTH: ICD-10-CM

## 2021-02-10 DIAGNOSIS — R26.9 GAIT ABNORMALITY: Primary | ICD-10-CM

## 2021-02-10 DIAGNOSIS — Z74.09 IMPAIRED MOBILITY: ICD-10-CM

## 2021-02-10 DIAGNOSIS — Z74.09 IMPAIRED MOBILITY AND ADLS: Primary | ICD-10-CM

## 2021-02-10 DIAGNOSIS — R27.8 COORDINATION IMPAIRMENT: ICD-10-CM

## 2021-02-10 PROCEDURE — 97110 THERAPEUTIC EXERCISES: CPT | Mod: PN

## 2021-02-10 PROCEDURE — 97530 THERAPEUTIC ACTIVITIES: CPT | Mod: PN

## 2021-02-10 PROCEDURE — 92507 TX SP LANG VOICE COMM INDIV: CPT | Mod: PN

## 2021-03-08 ENCOUNTER — TELEPHONE (OUTPATIENT)
Dept: REHABILITATION | Facility: HOSPITAL | Age: 41
End: 2021-03-08

## 2021-03-13 ENCOUNTER — HOSPITAL ENCOUNTER (EMERGENCY)
Facility: HOSPITAL | Age: 41
Discharge: HOME | End: 2021-03-13
Attending: EMERGENCY MEDICINE
Payer: MEDICAID

## 2021-03-13 VITALS
BODY MASS INDEX: 25.11 KG/M2 | OXYGEN SATURATION: 99 % | SYSTOLIC BLOOD PRESSURE: 110 MMHG | DIASTOLIC BLOOD PRESSURE: 63 MMHG | HEART RATE: 82 BPM | WEIGHT: 160 LBS | TEMPERATURE: 99 F | HEIGHT: 67 IN | RESPIRATION RATE: 18 BRPM

## 2021-03-13 DIAGNOSIS — S12.301A CLOSED NONDISPLACED FRACTURE OF FOURTH CERVICAL VERTEBRA, UNSPECIFIED FRACTURE MORPHOLOGY, INITIAL ENCOUNTER: ICD-10-CM

## 2021-03-13 DIAGNOSIS — Z76.0 MEDICATION REFILL: Primary | ICD-10-CM

## 2021-03-13 PROCEDURE — 99281 EMR DPT VST MAYX REQ PHY/QHP: CPT

## 2021-03-13 RX ORDER — OXYCODONE AND ACETAMINOPHEN 5; 325 MG/1; MG/1
1 TABLET ORAL EVERY 6 HOURS PRN
Qty: 12 TABLET | Refills: 0 | Status: SHIPPED | OUTPATIENT
Start: 2021-03-13 | End: 2021-03-16

## 2021-03-13 RX ORDER — GABAPENTIN 300 MG/1
600 CAPSULE ORAL 3 TIMES DAILY
Qty: 84 CAPSULE | Refills: 0 | Status: SHIPPED | OUTPATIENT
Start: 2021-03-13 | End: 2021-03-28 | Stop reason: SDUPTHER

## 2021-03-13 RX ORDER — ESCITALOPRAM OXALATE 10 MG/1
10 TABLET ORAL DAILY
Qty: 30 TABLET | Refills: 0 | Status: SHIPPED | OUTPATIENT
Start: 2021-03-13 | End: 2021-05-12

## 2021-03-13 RX ORDER — METHOCARBAMOL 500 MG/1
500 TABLET, FILM COATED ORAL 4 TIMES DAILY
Qty: 56 TABLET | Refills: 0 | Status: SHIPPED | OUTPATIENT
Start: 2021-03-13 | End: 2021-03-27

## 2021-03-16 ENCOUNTER — CLINICAL SUPPORT (OUTPATIENT)
Dept: REHABILITATION | Facility: HOSPITAL | Age: 41
End: 2021-03-16
Payer: MEDICAID

## 2021-03-16 ENCOUNTER — TELEPHONE (OUTPATIENT)
Dept: REHABILITATION | Facility: HOSPITAL | Age: 41
End: 2021-03-16

## 2021-03-16 DIAGNOSIS — Z74.09 IMPAIRED MOBILITY: ICD-10-CM

## 2021-03-16 DIAGNOSIS — R41.841 COGNITIVE COMMUNICATION DISORDER: ICD-10-CM

## 2021-03-16 PROCEDURE — 92507 TX SP LANG VOICE COMM INDIV: CPT | Mod: PN

## 2021-03-16 PROCEDURE — 97110 THERAPEUTIC EXERCISES: CPT | Mod: PN,59

## 2021-03-23 ENCOUNTER — CLINICAL SUPPORT (OUTPATIENT)
Dept: REHABILITATION | Facility: HOSPITAL | Age: 41
End: 2021-03-23
Payer: MEDICAID

## 2021-03-23 DIAGNOSIS — Z74.09 IMPAIRED MOBILITY: ICD-10-CM

## 2021-03-23 PROCEDURE — 97110 THERAPEUTIC EXERCISES: CPT | Mod: PN

## 2021-03-25 ENCOUNTER — CLINICAL SUPPORT (OUTPATIENT)
Dept: REHABILITATION | Facility: HOSPITAL | Age: 41
End: 2021-03-25
Payer: MEDICAID

## 2021-03-25 DIAGNOSIS — Z74.09 IMPAIRED MOBILITY: ICD-10-CM

## 2021-03-25 DIAGNOSIS — R41.841 COGNITIVE COMMUNICATION DISORDER: ICD-10-CM

## 2021-03-25 PROCEDURE — 97110 THERAPEUTIC EXERCISES: CPT | Mod: PN,59

## 2021-03-25 PROCEDURE — 92507 TX SP LANG VOICE COMM INDIV: CPT | Mod: PN

## 2021-03-28 ENCOUNTER — HOSPITAL ENCOUNTER (EMERGENCY)
Facility: HOSPITAL | Age: 41
Discharge: HOME | End: 2021-03-28
Attending: EMERGENCY MEDICINE
Payer: MEDICAID

## 2021-03-28 VITALS
HEART RATE: 58 BPM | RESPIRATION RATE: 16 BRPM | SYSTOLIC BLOOD PRESSURE: 143 MMHG | OXYGEN SATURATION: 98 % | DIASTOLIC BLOOD PRESSURE: 88 MMHG | TEMPERATURE: 98 F

## 2021-03-28 DIAGNOSIS — Z76.0 MEDICATION REFILL: Primary | ICD-10-CM

## 2021-03-28 PROCEDURE — 25000003 PHARM REV CODE 250: Performed by: EMERGENCY MEDICINE

## 2021-03-28 PROCEDURE — 99283 EMERGENCY DEPT VISIT LOW MDM: CPT

## 2021-03-28 RX ORDER — OXYCODONE AND ACETAMINOPHEN 5; 325 MG/1; MG/1
1 TABLET ORAL EVERY 8 HOURS PRN
Qty: 9 TABLET | Refills: 0 | Status: SHIPPED | OUTPATIENT
Start: 2021-03-28

## 2021-03-28 RX ORDER — OXYCODONE AND ACETAMINOPHEN 5; 325 MG/1; MG/1
1 TABLET ORAL
Status: COMPLETED | OUTPATIENT
Start: 2021-03-28 | End: 2021-03-28

## 2021-03-28 RX ORDER — GABAPENTIN 300 MG/1
600 CAPSULE ORAL 3 TIMES DAILY
Qty: 84 CAPSULE | Refills: 0 | Status: SHIPPED | OUTPATIENT
Start: 2021-03-28 | End: 2022-12-15

## 2021-03-28 RX ADMIN — OXYCODONE AND ACETAMINOPHEN 1 TABLET: 5; 325 TABLET ORAL at 11:03

## 2021-03-29 ENCOUNTER — TELEPHONE (OUTPATIENT)
Dept: REHABILITATION | Facility: HOSPITAL | Age: 41
End: 2021-03-29

## 2021-03-31 ENCOUNTER — CLINICAL SUPPORT (OUTPATIENT)
Dept: REHABILITATION | Facility: HOSPITAL | Age: 41
End: 2021-03-31
Payer: MEDICAID

## 2021-03-31 DIAGNOSIS — Z74.09 IMPAIRED MOBILITY: ICD-10-CM

## 2021-03-31 PROCEDURE — 97110 THERAPEUTIC EXERCISES: CPT | Mod: PN

## 2021-04-01 ENCOUNTER — CLINICAL SUPPORT (OUTPATIENT)
Dept: REHABILITATION | Facility: HOSPITAL | Age: 41
End: 2021-04-01
Payer: MEDICAID

## 2021-04-01 DIAGNOSIS — R41.841 COGNITIVE COMMUNICATION DISORDER: ICD-10-CM

## 2021-04-01 PROCEDURE — 92507 TX SP LANG VOICE COMM INDIV: CPT | Mod: PN

## 2021-04-06 ENCOUNTER — TELEPHONE (OUTPATIENT)
Dept: REHABILITATION | Facility: HOSPITAL | Age: 41
End: 2021-04-06

## 2021-04-08 ENCOUNTER — CLINICAL SUPPORT (OUTPATIENT)
Dept: REHABILITATION | Facility: HOSPITAL | Age: 41
End: 2021-04-08
Payer: MEDICAID

## 2021-04-08 DIAGNOSIS — Z74.09 IMPAIRED MOBILITY: ICD-10-CM

## 2021-04-08 DIAGNOSIS — R41.841 COGNITIVE COMMUNICATION DISORDER: ICD-10-CM

## 2021-04-08 PROCEDURE — 92507 TX SP LANG VOICE COMM INDIV: CPT | Mod: PN

## 2021-04-08 PROCEDURE — 97110 THERAPEUTIC EXERCISES: CPT | Mod: PN

## 2021-04-13 ENCOUNTER — TELEPHONE (OUTPATIENT)
Dept: REHABILITATION | Facility: HOSPITAL | Age: 41
End: 2021-04-13

## 2021-04-15 ENCOUNTER — CLINICAL SUPPORT (OUTPATIENT)
Dept: REHABILITATION | Facility: HOSPITAL | Age: 41
End: 2021-04-15
Payer: MEDICAID

## 2021-04-15 ENCOUNTER — LAB VISIT (OUTPATIENT)
Dept: PRIMARY CARE CLINIC | Facility: CLINIC | Age: 41
End: 2021-04-15
Payer: MEDICAID

## 2021-04-15 ENCOUNTER — OFFICE VISIT (OUTPATIENT)
Dept: PRIMARY CARE CLINIC | Facility: CLINIC | Age: 41
End: 2021-04-15
Payer: MEDICAID

## 2021-04-15 VITALS
WEIGHT: 169.75 LBS | TEMPERATURE: 98 F | RESPIRATION RATE: 20 BRPM | DIASTOLIC BLOOD PRESSURE: 60 MMHG | OXYGEN SATURATION: 98 % | BODY MASS INDEX: 23.77 KG/M2 | HEART RATE: 68 BPM | SYSTOLIC BLOOD PRESSURE: 100 MMHG | HEIGHT: 71 IN

## 2021-04-15 DIAGNOSIS — Z74.09 IMPAIRED MOBILITY: ICD-10-CM

## 2021-04-15 DIAGNOSIS — M62.838 MUSCLE SPASMS OF BOTH LOWER EXTREMITIES: ICD-10-CM

## 2021-04-15 DIAGNOSIS — Z11.59 ENCOUNTER FOR HEPATITIS C SCREENING TEST FOR LOW RISK PATIENT: ICD-10-CM

## 2021-04-15 DIAGNOSIS — G62.9 NEUROPATHY: ICD-10-CM

## 2021-04-15 DIAGNOSIS — N30.01 ACUTE CYSTITIS WITH HEMATURIA: ICD-10-CM

## 2021-04-15 DIAGNOSIS — Z13.220 SCREENING CHOLESTEROL LEVEL: ICD-10-CM

## 2021-04-15 DIAGNOSIS — Z98.890 NECK PAIN WITH HISTORY OF CERVICAL SPINAL SURGERY: ICD-10-CM

## 2021-04-15 DIAGNOSIS — Z11.4 SCREENING FOR HIV (HUMAN IMMUNODEFICIENCY VIRUS): ICD-10-CM

## 2021-04-15 DIAGNOSIS — Z13.0 SCREENING FOR DEFICIENCY ANEMIA: ICD-10-CM

## 2021-04-15 DIAGNOSIS — Z87.828 HISTORY OF SPINAL CORD INJURY: ICD-10-CM

## 2021-04-15 DIAGNOSIS — R41.841 COGNITIVE COMMUNICATION DISORDER: ICD-10-CM

## 2021-04-15 DIAGNOSIS — Z13.1 SCREENING FOR DIABETES MELLITUS: ICD-10-CM

## 2021-04-15 DIAGNOSIS — R31.9 HEMATURIA, UNSPECIFIED TYPE: Primary | ICD-10-CM

## 2021-04-15 DIAGNOSIS — M54.2 NECK PAIN WITH HISTORY OF CERVICAL SPINAL SURGERY: ICD-10-CM

## 2021-04-15 LAB
ALBUMIN SERPL BCP-MCNC: 4 G/DL (ref 3.5–5.2)
ALP SERPL-CCNC: 86 U/L (ref 55–135)
ALT SERPL W/O P-5'-P-CCNC: 15 U/L (ref 10–44)
ANION GAP SERPL CALC-SCNC: 7 MMOL/L (ref 8–16)
ANISOCYTOSIS BLD QL SMEAR: SLIGHT
AST SERPL-CCNC: 17 U/L (ref 10–40)
BACTERIA #/AREA URNS AUTO: ABNORMAL /HPF
BASOPHILS # BLD AUTO: 0.03 K/UL (ref 0–0.2)
BASOPHILS NFR BLD: 0.6 % (ref 0–1.9)
BILIRUB SERPL-MCNC: 0.3 MG/DL (ref 0.1–1)
BILIRUB SERPL-MCNC: NEGATIVE MG/DL
BILIRUB UR QL STRIP: ABNORMAL
BLOOD URINE, POC: ABNORMAL
BUN SERPL-MCNC: 14 MG/DL (ref 6–20)
CALCIUM SERPL-MCNC: 9.1 MG/DL (ref 8.7–10.5)
CHLORIDE SERPL-SCNC: 104 MMOL/L (ref 95–110)
CHOLEST SERPL-MCNC: 150 MG/DL (ref 120–199)
CHOLEST/HDLC SERPL: 3.9 {RATIO} (ref 2–5)
CLARITY UR REFRACT.AUTO: ABNORMAL
CLARITY, POC UA: CLEAR
CO2 SERPL-SCNC: 28 MMOL/L (ref 23–29)
COLOR UR AUTO: ABNORMAL
COLOR, POC UA: ABNORMAL
CREAT SERPL-MCNC: 1 MG/DL (ref 0.5–1.4)
DIFFERENTIAL METHOD: ABNORMAL
EOSINOPHIL # BLD AUTO: 0.1 K/UL (ref 0–0.5)
EOSINOPHIL NFR BLD: 1.7 % (ref 0–8)
ERYTHROCYTE [DISTWIDTH] IN BLOOD BY AUTOMATED COUNT: 14.5 % (ref 11.5–14.5)
EST. GFR  (AFRICAN AMERICAN): >60 ML/MIN/1.73 M^2
EST. GFR  (NON AFRICAN AMERICAN): >60 ML/MIN/1.73 M^2
ESTIMATED AVG GLUCOSE: 97 MG/DL (ref 68–131)
GLUCOSE SERPL-MCNC: 59 MG/DL (ref 70–110)
GLUCOSE UR QL STRIP: ABNORMAL
GLUCOSE UR QL STRIP: NEGATIVE
HBA1C MFR BLD: 5 % (ref 4–5.6)
HCT VFR BLD AUTO: 41 % (ref 40–54)
HDLC SERPL-MCNC: 38 MG/DL (ref 40–75)
HDLC SERPL: 25.3 % (ref 20–50)
HGB BLD-MCNC: 13.2 G/DL (ref 14–18)
HGB UR QL STRIP: NEGATIVE
HYALINE CASTS UR QL AUTO: 0 /LPF
HYPOCHROMIA BLD QL SMEAR: ABNORMAL
IMM GRANULOCYTES # BLD AUTO: 0.01 K/UL (ref 0–0.04)
IMM GRANULOCYTES NFR BLD AUTO: 0.2 % (ref 0–0.5)
KETONES UR QL STRIP: NEGATIVE
KETONES UR QL STRIP: NEGATIVE
LDLC SERPL CALC-MCNC: 90 MG/DL (ref 63–159)
LEUKOCYTE ESTERASE UR QL STRIP: ABNORMAL
LEUKOCYTE ESTERASE URINE, POC: 2
LYMPHOCYTES # BLD AUTO: 2.5 K/UL (ref 1–4.8)
LYMPHOCYTES NFR BLD: 45.9 % (ref 18–48)
MCH RBC QN AUTO: 28.1 PG (ref 27–31)
MCHC RBC AUTO-ENTMCNC: 32.2 G/DL (ref 32–36)
MCV RBC AUTO: 87 FL (ref 82–98)
MICROSCOPIC COMMENT: ABNORMAL
MONOCYTES # BLD AUTO: 0.5 K/UL (ref 0.3–1)
MONOCYTES NFR BLD: 9.3 % (ref 4–15)
NEUTROPHILS # BLD AUTO: 2.3 K/UL (ref 1.8–7.7)
NEUTROPHILS NFR BLD: 42.3 % (ref 38–73)
NITRITE UR QL STRIP: NEGATIVE
NITRITE, POC UA: NEGATIVE
NONHDLC SERPL-MCNC: 112 MG/DL
NRBC BLD-RTO: 0 /100 WBC
PH UR STRIP: 5 [PH] (ref 5–8)
PH, POC UA: 5
PLATELET # BLD AUTO: 313 K/UL (ref 150–450)
PLATELET BLD QL SMEAR: ABNORMAL
PMV BLD AUTO: 9.9 FL (ref 9.2–12.9)
POIKILOCYTOSIS BLD QL SMEAR: SLIGHT
POTASSIUM SERPL-SCNC: 4.2 MMOL/L (ref 3.5–5.1)
PROT SERPL-MCNC: 7.8 G/DL (ref 6–8.4)
PROT UR QL STRIP: ABNORMAL
PROTEIN, POC: NEGATIVE
RBC # BLD AUTO: 4.69 M/UL (ref 4.6–6.2)
RBC #/AREA URNS AUTO: 15 /HPF (ref 0–4)
SCHISTOCYTES BLD QL SMEAR: ABNORMAL
SODIUM SERPL-SCNC: 139 MMOL/L (ref 136–145)
SP GR UR STRIP: 1.03 (ref 1–1.03)
SPECIFIC GRAVITY, POC UA: ABNORMAL
TRIGL SERPL-MCNC: 110 MG/DL (ref 30–150)
URN SPEC COLLECT METH UR: ABNORMAL
UROBILINOGEN, POC UA: NORMAL
WBC # BLD AUTO: 5.4 K/UL (ref 3.9–12.7)
WBC #/AREA URNS AUTO: >100 /HPF (ref 0–5)
WBC CLUMPS UR QL AUTO: ABNORMAL

## 2021-04-15 PROCEDURE — 80061 LIPID PANEL: CPT | Performed by: NURSE PRACTITIONER

## 2021-04-15 PROCEDURE — 36415 COLL VENOUS BLD VENIPUNCTURE: CPT | Mod: PBBFAC,PN | Performed by: NURSE PRACTITIONER

## 2021-04-15 PROCEDURE — 85025 COMPLETE CBC W/AUTO DIFF WBC: CPT | Performed by: NURSE PRACTITIONER

## 2021-04-15 PROCEDURE — 81002 URINALYSIS NONAUTO W/O SCOPE: CPT | Mod: PBBFAC,PN,59 | Performed by: NURSE PRACTITIONER

## 2021-04-15 PROCEDURE — 87186 SC STD MICRODIL/AGAR DIL: CPT | Performed by: NURSE PRACTITIONER

## 2021-04-15 PROCEDURE — 80053 COMPREHEN METABOLIC PANEL: CPT | Performed by: NURSE PRACTITIONER

## 2021-04-15 PROCEDURE — 97110 THERAPEUTIC EXERCISES: CPT | Mod: PN

## 2021-04-15 PROCEDURE — 87088 URINE BACTERIA CULTURE: CPT | Performed by: NURSE PRACTITIONER

## 2021-04-15 PROCEDURE — 87086 URINE CULTURE/COLONY COUNT: CPT | Performed by: NURSE PRACTITIONER

## 2021-04-15 PROCEDURE — 99215 OFFICE O/P EST HI 40 MIN: CPT | Mod: PBBFAC,PN | Performed by: NURSE PRACTITIONER

## 2021-04-15 PROCEDURE — 92507 TX SP LANG VOICE COMM INDIV: CPT | Mod: PN

## 2021-04-15 PROCEDURE — 86803 HEPATITIS C AB TEST: CPT | Performed by: NURSE PRACTITIONER

## 2021-04-15 PROCEDURE — 99999 PR PBB SHADOW E&M-EST. PATIENT-LVL V: CPT | Mod: PBBFAC,,, | Performed by: NURSE PRACTITIONER

## 2021-04-15 PROCEDURE — 87077 CULTURE AEROBIC IDENTIFY: CPT | Performed by: NURSE PRACTITIONER

## 2021-04-15 PROCEDURE — 86703 HIV-1/HIV-2 1 RESULT ANTBDY: CPT | Performed by: NURSE PRACTITIONER

## 2021-04-15 PROCEDURE — 81001 URINALYSIS AUTO W/SCOPE: CPT | Performed by: NURSE PRACTITIONER

## 2021-04-15 PROCEDURE — 99999 PR PBB SHADOW E&M-EST. PATIENT-LVL V: ICD-10-PCS | Mod: PBBFAC,,, | Performed by: NURSE PRACTITIONER

## 2021-04-15 PROCEDURE — 99204 PR OFFICE/OUTPT VISIT, NEW, LEVL IV, 45-59 MIN: ICD-10-PCS | Mod: S$PBB,,, | Performed by: NURSE PRACTITIONER

## 2021-04-15 PROCEDURE — 83036 HEMOGLOBIN GLYCOSYLATED A1C: CPT | Performed by: NURSE PRACTITIONER

## 2021-04-15 PROCEDURE — 36415 COLL VENOUS BLD VENIPUNCTURE: CPT | Performed by: NURSE PRACTITIONER

## 2021-04-15 PROCEDURE — 99204 OFFICE O/P NEW MOD 45 MIN: CPT | Mod: S$PBB,,, | Performed by: NURSE PRACTITIONER

## 2021-04-15 RX ORDER — METHOCARBAMOL 750 MG/1
750 TABLET, FILM COATED ORAL 4 TIMES DAILY
Qty: 40 TABLET | Refills: 0 | Status: SHIPPED | OUTPATIENT
Start: 2021-04-15 | End: 2021-04-25

## 2021-04-15 RX ORDER — NITROFURANTOIN (MACROCRYSTALS) 100 MG/1
100 CAPSULE ORAL EVERY 12 HOURS
Qty: 10 CAPSULE | Refills: 0 | Status: SHIPPED | OUTPATIENT
Start: 2021-04-15 | End: 2021-04-20

## 2021-04-16 LAB
HCV AB SERPL QL IA: NEGATIVE
HIV 1+2 AB+HIV1 P24 AG SERPL QL IA: NEGATIVE

## 2021-04-17 LAB — BACTERIA UR CULT: ABNORMAL

## 2021-04-20 ENCOUNTER — DOCUMENTATION ONLY (OUTPATIENT)
Dept: REHABILITATION | Facility: HOSPITAL | Age: 41
End: 2021-04-20

## 2021-04-20 ENCOUNTER — TELEPHONE (OUTPATIENT)
Dept: REHABILITATION | Facility: HOSPITAL | Age: 41
End: 2021-04-20

## 2021-04-22 ENCOUNTER — TELEPHONE (OUTPATIENT)
Dept: REHABILITATION | Facility: HOSPITAL | Age: 41
End: 2021-04-22

## 2021-04-23 ENCOUNTER — OFFICE VISIT (OUTPATIENT)
Dept: PRIMARY CARE CLINIC | Facility: CLINIC | Age: 41
End: 2021-04-23
Payer: MEDICAID

## 2021-04-23 DIAGNOSIS — M54.2 NECK PAIN WITH HISTORY OF CERVICAL SPINAL SURGERY: ICD-10-CM

## 2021-04-23 DIAGNOSIS — Z98.890 NECK PAIN WITH HISTORY OF CERVICAL SPINAL SURGERY: ICD-10-CM

## 2021-04-23 DIAGNOSIS — N30.90 CYSTITIS: Primary | ICD-10-CM

## 2021-04-23 DIAGNOSIS — R31.9 HEMATURIA, UNSPECIFIED TYPE: ICD-10-CM

## 2021-04-23 PROCEDURE — 99213 PR OFFICE/OUTPT VISIT, EST, LEVL III, 20-29 MIN: ICD-10-PCS | Mod: 95,,, | Performed by: NURSE PRACTITIONER

## 2021-04-23 PROCEDURE — 99213 OFFICE O/P EST LOW 20 MIN: CPT | Mod: 95,,, | Performed by: NURSE PRACTITIONER

## 2021-04-23 RX ORDER — KETOROLAC TROMETHAMINE 10 MG/1
10 TABLET, FILM COATED ORAL EVERY 6 HOURS PRN
Qty: 20 TABLET | Refills: 0 | Status: SHIPPED | OUTPATIENT
Start: 2021-04-23 | End: 2021-04-28

## 2021-04-23 RX ORDER — NITROFURANTOIN (MACROCRYSTALS) 100 MG/1
100 CAPSULE ORAL EVERY 12 HOURS
Qty: 28 CAPSULE | Refills: 0 | Status: SHIPPED | OUTPATIENT
Start: 2021-04-23 | End: 2021-04-28

## 2021-04-27 ENCOUNTER — CLINICAL SUPPORT (OUTPATIENT)
Dept: REHABILITATION | Facility: HOSPITAL | Age: 41
End: 2021-04-27
Payer: MEDICAID

## 2021-04-27 DIAGNOSIS — Z74.09 IMPAIRED MOBILITY: ICD-10-CM

## 2021-04-27 DIAGNOSIS — R41.841 COGNITIVE COMMUNICATION DISORDER: ICD-10-CM

## 2021-04-27 PROCEDURE — 97110 THERAPEUTIC EXERCISES: CPT | Mod: PN

## 2021-04-27 PROCEDURE — 92507 TX SP LANG VOICE COMM INDIV: CPT | Mod: PN

## 2021-04-29 ENCOUNTER — DOCUMENTATION ONLY (OUTPATIENT)
Dept: REHABILITATION | Facility: HOSPITAL | Age: 41
End: 2021-04-29

## 2021-04-29 DIAGNOSIS — Z78.9 IMPAIRED MOBILITY AND ADLS: Primary | ICD-10-CM

## 2021-04-29 DIAGNOSIS — Z74.09 IMPAIRED MOBILITY AND ADLS: Primary | ICD-10-CM

## 2021-04-29 DIAGNOSIS — R68.89 IMPAIRED FUNCTION OF UPPER EXTREMITY: ICD-10-CM

## 2021-04-29 DIAGNOSIS — R29.898 DECREASED GRIP STRENGTH: ICD-10-CM

## 2021-04-29 DIAGNOSIS — R27.8 COORDINATION IMPAIRMENT: ICD-10-CM

## 2021-04-30 ENCOUNTER — CLINICAL SUPPORT (OUTPATIENT)
Dept: REHABILITATION | Facility: HOSPITAL | Age: 41
End: 2021-04-30
Payer: MEDICAID

## 2021-04-30 DIAGNOSIS — Z74.09 IMPAIRED MOBILITY: ICD-10-CM

## 2021-04-30 DIAGNOSIS — R41.841 COGNITIVE COMMUNICATION DISORDER: Primary | ICD-10-CM

## 2021-04-30 PROCEDURE — 92507 TX SP LANG VOICE COMM INDIV: CPT | Mod: PN

## 2021-04-30 PROCEDURE — 97110 THERAPEUTIC EXERCISES: CPT | Mod: PN

## 2021-05-04 ENCOUNTER — TELEPHONE (OUTPATIENT)
Dept: REHABILITATION | Facility: HOSPITAL | Age: 41
End: 2021-05-04

## 2021-05-06 ENCOUNTER — TELEPHONE (OUTPATIENT)
Dept: REHABILITATION | Facility: HOSPITAL | Age: 41
End: 2021-05-06

## 2021-05-11 ENCOUNTER — CLINICAL SUPPORT (OUTPATIENT)
Dept: REHABILITATION | Facility: HOSPITAL | Age: 41
End: 2021-05-11
Payer: MEDICAID

## 2021-05-11 DIAGNOSIS — Z74.09 IMPAIRED MOBILITY: ICD-10-CM

## 2021-05-11 PROCEDURE — 97110 THERAPEUTIC EXERCISES: CPT | Mod: PN

## 2021-05-12 ENCOUNTER — OFFICE VISIT (OUTPATIENT)
Dept: PRIMARY CARE CLINIC | Facility: CLINIC | Age: 41
End: 2021-05-12
Payer: MEDICAID

## 2021-05-12 VITALS
OXYGEN SATURATION: 97 % | DIASTOLIC BLOOD PRESSURE: 80 MMHG | BODY MASS INDEX: 25.84 KG/M2 | WEIGHT: 174.5 LBS | HEART RATE: 82 BPM | TEMPERATURE: 98 F | RESPIRATION RATE: 18 BRPM | HEIGHT: 69 IN | SYSTOLIC BLOOD PRESSURE: 132 MMHG

## 2021-05-12 DIAGNOSIS — G89.4 CHRONIC PAIN SYNDROME: Primary | ICD-10-CM

## 2021-05-12 PROCEDURE — 99215 OFFICE O/P EST HI 40 MIN: CPT | Mod: PBBFAC,PN | Performed by: FAMILY MEDICINE

## 2021-05-12 PROCEDURE — 99999 PR PBB SHADOW E&M-EST. PATIENT-LVL V: CPT | Mod: PBBFAC,,, | Performed by: FAMILY MEDICINE

## 2021-05-12 PROCEDURE — 99999 PR PBB SHADOW E&M-EST. PATIENT-LVL V: ICD-10-PCS | Mod: PBBFAC,,, | Performed by: FAMILY MEDICINE

## 2021-05-12 PROCEDURE — 99213 PR OFFICE/OUTPT VISIT, EST, LEVL III, 20-29 MIN: ICD-10-PCS | Mod: S$PBB,,, | Performed by: FAMILY MEDICINE

## 2021-05-12 PROCEDURE — 99213 OFFICE O/P EST LOW 20 MIN: CPT | Mod: S$PBB,,, | Performed by: FAMILY MEDICINE

## 2021-05-13 ENCOUNTER — CLINICAL SUPPORT (OUTPATIENT)
Dept: REHABILITATION | Facility: HOSPITAL | Age: 41
End: 2021-05-13
Payer: MEDICAID

## 2021-05-13 DIAGNOSIS — R41.841 COGNITIVE COMMUNICATION DISORDER: ICD-10-CM

## 2021-05-13 DIAGNOSIS — Z74.09 IMPAIRED MOBILITY: ICD-10-CM

## 2021-05-13 PROCEDURE — 92507 TX SP LANG VOICE COMM INDIV: CPT | Mod: PN

## 2021-05-13 PROCEDURE — 97110 THERAPEUTIC EXERCISES: CPT | Mod: PN,59

## 2021-05-18 ENCOUNTER — TELEPHONE (OUTPATIENT)
Dept: REHABILITATION | Facility: HOSPITAL | Age: 41
End: 2021-05-18

## 2021-05-20 ENCOUNTER — CLINICAL SUPPORT (OUTPATIENT)
Dept: REHABILITATION | Facility: HOSPITAL | Age: 41
End: 2021-05-20
Payer: MEDICAID

## 2021-05-20 DIAGNOSIS — R41.841 COGNITIVE COMMUNICATION DISORDER: ICD-10-CM

## 2021-05-20 PROCEDURE — 92507 TX SP LANG VOICE COMM INDIV: CPT | Mod: PN

## 2021-05-26 ENCOUNTER — CLINICAL SUPPORT (OUTPATIENT)
Dept: REHABILITATION | Facility: HOSPITAL | Age: 41
End: 2021-05-26
Payer: MEDICAID

## 2021-05-26 DIAGNOSIS — R41.841 COGNITIVE COMMUNICATION DISORDER: ICD-10-CM

## 2021-05-26 PROCEDURE — 92507 TX SP LANG VOICE COMM INDIV: CPT | Mod: PN

## 2021-06-02 ENCOUNTER — CLINICAL SUPPORT (OUTPATIENT)
Dept: REHABILITATION | Facility: HOSPITAL | Age: 41
End: 2021-06-02
Payer: MEDICAID

## 2021-06-02 DIAGNOSIS — R41.841 COGNITIVE COMMUNICATION DISORDER: ICD-10-CM

## 2021-06-02 PROCEDURE — 92507 TX SP LANG VOICE COMM INDIV: CPT | Mod: PN

## 2021-06-04 ENCOUNTER — TELEPHONE (OUTPATIENT)
Dept: REHABILITATION | Facility: HOSPITAL | Age: 41
End: 2021-06-04

## 2021-06-04 ENCOUNTER — TELEPHONE (OUTPATIENT)
Dept: PAIN MEDICINE | Facility: CLINIC | Age: 41
End: 2021-06-04

## 2021-06-09 ENCOUNTER — CLINICAL SUPPORT (OUTPATIENT)
Dept: REHABILITATION | Facility: HOSPITAL | Age: 41
End: 2021-06-09
Payer: MEDICAID

## 2021-06-09 DIAGNOSIS — R41.841 COGNITIVE COMMUNICATION DISORDER: Primary | ICD-10-CM

## 2021-06-09 PROCEDURE — 92507 TX SP LANG VOICE COMM INDIV: CPT | Mod: PN

## 2021-06-11 ENCOUNTER — CLINICAL SUPPORT (OUTPATIENT)
Dept: REHABILITATION | Facility: HOSPITAL | Age: 41
End: 2021-06-11
Payer: MEDICAID

## 2021-06-11 DIAGNOSIS — R41.841 COGNITIVE COMMUNICATION DISORDER: ICD-10-CM

## 2021-06-11 PROCEDURE — 92507 TX SP LANG VOICE COMM INDIV: CPT | Mod: PN

## 2021-06-15 ENCOUNTER — DOCUMENTATION ONLY (OUTPATIENT)
Dept: REHABILITATION | Facility: HOSPITAL | Age: 41
End: 2021-06-15

## 2021-06-15 ENCOUNTER — TELEPHONE (OUTPATIENT)
Dept: REHABILITATION | Facility: HOSPITAL | Age: 41
End: 2021-06-15

## 2021-06-18 ENCOUNTER — TELEPHONE (OUTPATIENT)
Dept: REHABILITATION | Facility: HOSPITAL | Age: 41
End: 2021-06-18

## 2021-06-22 ENCOUNTER — CLINICAL SUPPORT (OUTPATIENT)
Dept: REHABILITATION | Facility: HOSPITAL | Age: 41
End: 2021-06-22
Payer: MEDICAID

## 2021-06-22 DIAGNOSIS — R41.841 COGNITIVE COMMUNICATION DISORDER: Primary | ICD-10-CM

## 2021-06-22 PROCEDURE — 92507 TX SP LANG VOICE COMM INDIV: CPT | Mod: PN

## 2021-06-25 ENCOUNTER — CLINICAL SUPPORT (OUTPATIENT)
Dept: REHABILITATION | Facility: HOSPITAL | Age: 41
End: 2021-06-25
Payer: MEDICAID

## 2021-06-25 DIAGNOSIS — R41.841 COGNITIVE COMMUNICATION DISORDER: ICD-10-CM

## 2021-06-28 ENCOUNTER — DOCUMENTATION ONLY (OUTPATIENT)
Dept: REHABILITATION | Facility: HOSPITAL | Age: 41
End: 2021-06-28

## 2021-06-28 ENCOUNTER — CLINICAL SUPPORT (OUTPATIENT)
Dept: REHABILITATION | Facility: HOSPITAL | Age: 41
End: 2021-06-28
Payer: MEDICAID

## 2021-06-28 DIAGNOSIS — R41.841 COGNITIVE COMMUNICATION DISORDER: ICD-10-CM

## 2021-06-28 PROCEDURE — 92507 TX SP LANG VOICE COMM INDIV: CPT | Mod: PN

## 2021-06-30 ENCOUNTER — TELEPHONE (OUTPATIENT)
Dept: PSYCHIATRY | Facility: CLINIC | Age: 41
End: 2021-06-30

## 2021-07-01 ENCOUNTER — TELEPHONE (OUTPATIENT)
Dept: PHYSICAL MEDICINE AND REHAB | Facility: CLINIC | Age: 41
End: 2021-07-01

## 2021-07-02 ENCOUNTER — TELEPHONE (OUTPATIENT)
Dept: REHABILITATION | Facility: HOSPITAL | Age: 41
End: 2021-07-02

## 2021-07-07 ENCOUNTER — PATIENT MESSAGE (OUTPATIENT)
Dept: PSYCHIATRY | Facility: CLINIC | Age: 41
End: 2021-07-07

## 2021-07-07 ENCOUNTER — TELEPHONE (OUTPATIENT)
Dept: PSYCHIATRY | Facility: CLINIC | Age: 41
End: 2021-07-07

## 2021-08-19 ENCOUNTER — DOCUMENTATION ONLY (OUTPATIENT)
Dept: REHABILITATION | Facility: HOSPITAL | Age: 41
End: 2021-08-19

## 2022-12-15 ENCOUNTER — OFFICE VISIT (OUTPATIENT)
Dept: PRIMARY CARE CLINIC | Facility: CLINIC | Age: 42
End: 2022-12-15
Payer: MEDICAID

## 2022-12-15 VITALS
BODY MASS INDEX: 25.87 KG/M2 | DIASTOLIC BLOOD PRESSURE: 77 MMHG | HEART RATE: 72 BPM | WEIGHT: 174.69 LBS | RESPIRATION RATE: 20 BRPM | TEMPERATURE: 98 F | SYSTOLIC BLOOD PRESSURE: 149 MMHG | HEIGHT: 69 IN

## 2022-12-15 DIAGNOSIS — G89.4 CHRONIC PAIN SYNDROME: Primary | ICD-10-CM

## 2022-12-15 PROCEDURE — 3008F PR BODY MASS INDEX (BMI) DOCUMENTED: ICD-10-PCS | Mod: CPTII,,, | Performed by: FAMILY MEDICINE

## 2022-12-15 PROCEDURE — 3077F PR MOST RECENT SYSTOLIC BLOOD PRESSURE >= 140 MM HG: ICD-10-PCS | Mod: CPTII,,, | Performed by: FAMILY MEDICINE

## 2022-12-15 PROCEDURE — 3008F BODY MASS INDEX DOCD: CPT | Mod: CPTII,,, | Performed by: FAMILY MEDICINE

## 2022-12-15 PROCEDURE — 99214 PR OFFICE/OUTPT VISIT, EST, LEVL IV, 30-39 MIN: ICD-10-PCS | Mod: S$PBB,,, | Performed by: FAMILY MEDICINE

## 2022-12-15 PROCEDURE — 3077F SYST BP >= 140 MM HG: CPT | Mod: CPTII,,, | Performed by: FAMILY MEDICINE

## 2022-12-15 PROCEDURE — 99999 PR PBB SHADOW E&M-EST. PATIENT-LVL IV: ICD-10-PCS | Mod: PBBFAC,,, | Performed by: FAMILY MEDICINE

## 2022-12-15 PROCEDURE — 1159F MED LIST DOCD IN RCRD: CPT | Mod: CPTII,,, | Performed by: FAMILY MEDICINE

## 2022-12-15 PROCEDURE — 1160F RVW MEDS BY RX/DR IN RCRD: CPT | Mod: CPTII,,, | Performed by: FAMILY MEDICINE

## 2022-12-15 PROCEDURE — 3078F DIAST BP <80 MM HG: CPT | Mod: CPTII,,, | Performed by: FAMILY MEDICINE

## 2022-12-15 PROCEDURE — 99214 OFFICE O/P EST MOD 30 MIN: CPT | Mod: PBBFAC,PN | Performed by: FAMILY MEDICINE

## 2022-12-15 PROCEDURE — 1160F PR REVIEW ALL MEDS BY PRESCRIBER/CLIN PHARMACIST DOCUMENTED: ICD-10-PCS | Mod: CPTII,,, | Performed by: FAMILY MEDICINE

## 2022-12-15 PROCEDURE — 3078F PR MOST RECENT DIASTOLIC BLOOD PRESSURE < 80 MM HG: ICD-10-PCS | Mod: CPTII,,, | Performed by: FAMILY MEDICINE

## 2022-12-15 PROCEDURE — 1159F PR MEDICATION LIST DOCUMENTED IN MEDICAL RECORD: ICD-10-PCS | Mod: CPTII,,, | Performed by: FAMILY MEDICINE

## 2022-12-15 PROCEDURE — 99214 OFFICE O/P EST MOD 30 MIN: CPT | Mod: S$PBB,,, | Performed by: FAMILY MEDICINE

## 2022-12-15 PROCEDURE — 99999 PR PBB SHADOW E&M-EST. PATIENT-LVL IV: CPT | Mod: PBBFAC,,, | Performed by: FAMILY MEDICINE

## 2022-12-15 RX ORDER — METHOCARBAMOL 500 MG/1
500 TABLET, FILM COATED ORAL 4 TIMES DAILY
Qty: 120 TABLET | Refills: 6 | Status: SHIPPED | OUTPATIENT
Start: 2022-12-15 | End: 2023-08-28

## 2022-12-15 RX ORDER — DICLOFENAC SODIUM 75 MG/1
75 TABLET, DELAYED RELEASE ORAL 2 TIMES DAILY PRN
Qty: 60 TABLET | Refills: 1 | Status: SHIPPED | OUTPATIENT
Start: 2022-12-15 | End: 2023-02-14

## 2022-12-15 RX ORDER — METHOCARBAMOL 500 MG/1
500 TABLET, FILM COATED ORAL 4 TIMES DAILY
Qty: 120 TABLET | Refills: 6 | Status: SHIPPED | OUTPATIENT
Start: 2022-12-15 | End: 2022-12-15 | Stop reason: SDUPTHER

## 2022-12-15 RX ORDER — ACETAMINOPHEN 500 MG
500 TABLET ORAL EVERY 6 HOURS PRN
Qty: 120 TABLET | Refills: 3 | Status: SHIPPED | OUTPATIENT
Start: 2022-12-15 | End: 2022-12-15 | Stop reason: SDUPTHER

## 2022-12-15 RX ORDER — ACETAMINOPHEN 500 MG
500 TABLET ORAL EVERY 6 HOURS PRN
Qty: 120 TABLET | Refills: 3 | Status: SHIPPED | OUTPATIENT
Start: 2022-12-15

## 2022-12-15 RX ORDER — GABAPENTIN 600 MG/1
600 TABLET ORAL 4 TIMES DAILY
Qty: 120 TABLET | Refills: 6 | Status: SHIPPED | OUTPATIENT
Start: 2022-12-15 | End: 2022-12-15 | Stop reason: SDUPTHER

## 2022-12-15 RX ORDER — GABAPENTIN 600 MG/1
600 TABLET ORAL 4 TIMES DAILY
Qty: 120 TABLET | Refills: 6 | Status: SHIPPED | OUTPATIENT
Start: 2022-12-15 | End: 2024-01-16

## 2022-12-15 RX ORDER — DICLOFENAC SODIUM 75 MG/1
75 TABLET, DELAYED RELEASE ORAL 2 TIMES DAILY PRN
Qty: 60 TABLET | Refills: 1 | Status: SHIPPED | OUTPATIENT
Start: 2022-12-15 | End: 2022-12-15 | Stop reason: SDUPTHER

## 2022-12-15 RX ORDER — METHOCARBAMOL 500 MG/1
TABLET, FILM COATED ORAL
COMMUNITY
End: 2022-12-15 | Stop reason: SDUPTHER

## 2022-12-15 NOTE — Clinical Note
Can you please call [facility] off [address] to cancel all prescriptions sent there. (Gabapentin and robaxin). Thanks
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
## 2023-04-04 ENCOUNTER — HOSPITAL ENCOUNTER (EMERGENCY)
Facility: HOSPITAL | Age: 43
Discharge: HOME | End: 2023-04-05
Attending: STUDENT IN AN ORGANIZED HEALTH CARE EDUCATION/TRAINING PROGRAM
Payer: MEDICARE

## 2023-04-04 DIAGNOSIS — T81.49XA WOUND INFECTION AFTER SURGERY: ICD-10-CM

## 2023-04-04 DIAGNOSIS — L02.91 PHLEGMON: Primary | ICD-10-CM

## 2023-04-04 DIAGNOSIS — M79.89 LEG SWELLING: ICD-10-CM

## 2023-04-04 PROCEDURE — 96374 THER/PROPH/DIAG INJ IV PUSH: CPT

## 2023-04-04 PROCEDURE — 99285 EMERGENCY DEPT VISIT HI MDM: CPT | Mod: 25

## 2023-04-04 NOTE — Clinical Note
"Froy"Hazel Zuniga was seen and treated in our emergency department on 4/4/2023.  He may return to work on 04/12/2023.       If you have any questions or concerns, please don't hesitate to call.      Sherice Ghotra MD"

## 2023-04-05 VITALS
TEMPERATURE: 97 F | DIASTOLIC BLOOD PRESSURE: 75 MMHG | HEART RATE: 58 BPM | SYSTOLIC BLOOD PRESSURE: 137 MMHG | RESPIRATION RATE: 18 BRPM | WEIGHT: 165 LBS | BODY MASS INDEX: 24.37 KG/M2 | OXYGEN SATURATION: 99 %

## 2023-04-05 LAB
ALBUMIN SERPL BCP-MCNC: 3.6 G/DL (ref 3.5–5.2)
ALP SERPL-CCNC: 50 U/L (ref 55–135)
ALT SERPL W/O P-5'-P-CCNC: 19 U/L (ref 10–44)
ANION GAP SERPL CALC-SCNC: 8 MMOL/L (ref 8–16)
AST SERPL-CCNC: 16 U/L (ref 10–40)
BASOPHILS # BLD AUTO: 0.02 K/UL (ref 0–0.2)
BASOPHILS NFR BLD: 0.3 % (ref 0–1.9)
BILIRUB SERPL-MCNC: 0.6 MG/DL (ref 0.1–1)
BUN SERPL-MCNC: 24 MG/DL (ref 6–20)
CALCIUM SERPL-MCNC: 8.9 MG/DL (ref 8.7–10.5)
CHLORIDE SERPL-SCNC: 99 MMOL/L (ref 95–110)
CO2 SERPL-SCNC: 30 MMOL/L (ref 23–29)
CREAT SERPL-MCNC: 1 MG/DL (ref 0.5–1.4)
CRP SERPL-MCNC: 1.96 MG/DL
DIFFERENTIAL METHOD: ABNORMAL
EOSINOPHIL # BLD AUTO: 0.2 K/UL (ref 0–0.5)
EOSINOPHIL NFR BLD: 2.5 % (ref 0–8)
ERYTHROCYTE [DISTWIDTH] IN BLOOD BY AUTOMATED COUNT: 14 % (ref 11.5–14.5)
ERYTHROCYTE [SEDIMENTATION RATE] IN BLOOD BY WESTERGREN METHOD: 52 MM/HR (ref 0–10)
EST. GFR  (NO RACE VARIABLE): >60 ML/MIN/1.73 M^2
GLUCOSE SERPL-MCNC: 125 MG/DL (ref 70–110)
HCT VFR BLD AUTO: 32.2 % (ref 40–54)
HGB BLD-MCNC: 10.5 G/DL (ref 14–18)
IMM GRANULOCYTES # BLD AUTO: 0.02 K/UL (ref 0–0.04)
IMM GRANULOCYTES NFR BLD AUTO: 0.3 % (ref 0–0.5)
LYMPHOCYTES # BLD AUTO: 2.1 K/UL (ref 1–4.8)
LYMPHOCYTES NFR BLD: 30.5 % (ref 18–48)
MCH RBC QN AUTO: 28.5 PG (ref 27–31)
MCHC RBC AUTO-ENTMCNC: 32.6 G/DL (ref 32–36)
MCV RBC AUTO: 87 FL (ref 82–98)
MONOCYTES # BLD AUTO: 0.8 K/UL (ref 0.3–1)
MONOCYTES NFR BLD: 12 % (ref 4–15)
NEUTROPHILS # BLD AUTO: 3.8 K/UL (ref 1.8–7.7)
NEUTROPHILS NFR BLD: 54.4 % (ref 38–73)
NRBC BLD-RTO: 0 /100 WBC
PLATELET # BLD AUTO: 287 K/UL (ref 150–450)
PMV BLD AUTO: 9 FL (ref 9.2–12.9)
POTASSIUM SERPL-SCNC: 3.8 MMOL/L (ref 3.5–5.1)
PROT SERPL-MCNC: 7.1 G/DL (ref 6–8.4)
RBC # BLD AUTO: 3.69 M/UL (ref 4.6–6.2)
SODIUM SERPL-SCNC: 137 MMOL/L (ref 136–145)
WBC # BLD AUTO: 6.92 K/UL (ref 3.9–12.7)

## 2023-04-05 PROCEDURE — 80053 COMPREHEN METABOLIC PANEL: CPT | Performed by: STUDENT IN AN ORGANIZED HEALTH CARE EDUCATION/TRAINING PROGRAM

## 2023-04-05 PROCEDURE — 85025 COMPLETE CBC W/AUTO DIFF WBC: CPT | Performed by: STUDENT IN AN ORGANIZED HEALTH CARE EDUCATION/TRAINING PROGRAM

## 2023-04-05 PROCEDURE — 86140 C-REACTIVE PROTEIN: CPT | Performed by: STUDENT IN AN ORGANIZED HEALTH CARE EDUCATION/TRAINING PROGRAM

## 2023-04-05 PROCEDURE — 85651 RBC SED RATE NONAUTOMATED: CPT | Performed by: STUDENT IN AN ORGANIZED HEALTH CARE EDUCATION/TRAINING PROGRAM

## 2023-04-05 PROCEDURE — 63600175 PHARM REV CODE 636 W HCPCS: Performed by: STUDENT IN AN ORGANIZED HEALTH CARE EDUCATION/TRAINING PROGRAM

## 2023-04-05 RX ORDER — MORPHINE SULFATE 4 MG/ML
4 INJECTION, SOLUTION INTRAMUSCULAR; INTRAVENOUS
Status: COMPLETED | OUTPATIENT
Start: 2023-04-05 | End: 2023-04-05

## 2023-04-05 RX ORDER — SULFAMETHOXAZOLE AND TRIMETHOPRIM 800; 160 MG/1; MG/1
1 TABLET ORAL 2 TIMES DAILY
Qty: 14 TABLET | Refills: 0 | Status: SHIPPED | OUTPATIENT
Start: 2023-04-05 | End: 2023-04-12

## 2023-04-05 RX ORDER — HYDROCODONE BITARTRATE AND ACETAMINOPHEN 5; 325 MG/1; MG/1
1 TABLET ORAL EVERY 8 HOURS PRN
Qty: 12 TABLET | Refills: 0 | Status: SHIPPED | OUTPATIENT
Start: 2023-04-05

## 2023-04-05 RX ADMIN — MORPHINE SULFATE 4 MG: 4 INJECTION, SOLUTION INTRAMUSCULAR; INTRAVENOUS at 12:04

## 2023-04-05 NOTE — DISCHARGE INSTRUCTIONS
Please return to the ER for any worsening or concerning symptoms.  If you developed significant fever, worsening pain, purulent drainage, red skin, return to the ER.  Please follow-up urgently with your orthopedic surgeon regarding the infection.  Within the next week.  Please take medications, antibiotics, as prescribed.    Imaging Results              US Lower Extremity Veins Right (Final result)  Result time 04/05/23 00:33:25      Final result by Rudy Rosenthal MD (04/05/23 00:33:25)                   Narrative:    EXAM DESCRIPTION:    US LOWER EXTREMITY VEINS RIGHT  [facility]: US LOWER EXTREMITY VEINS LIMITED FOLLOW-UP UNILATERAL    CLINICAL HISTORY:    42 years  Male  evaluation for DVT.    COMPARISON:    None    TECHNIQUE:    Duplex venous Ultrasound of the lower extremity was obtained.    FINDINGS:    There is normal flow and compressibility seen within the deep venous structures from the common femoral vein down to the posterior tibial vein. There is no evidence for deep venous thrombosis.    IMPRESSION:    1.  No evidence for DVT.    Electronically signed by:  Rudy Hernandez MD, KIRA  4/5/2023 12:33 AM CDT Workstation: [id number]                                     US Soft Tissue, Lower Extremity, Right (Final result)  Result time 04/05/23 00:51:48      Final result by Petra Rodriguez MD (04/05/23 00:51:48)                   Narrative:    EXAM:  US Right Lower Extremity Non-Vascular, Limited    CLINICAL HISTORY:  The patient is 42 years old and is Male; soft tissue swelling at surgical site; evaluate for abscess    TECHNIQUE:  Real-time ultrasound scan of the right lower extremity with image documentation.    COMPARISON:  No relevant prior studies available.    FINDINGS:  In the area of concern along the medial aspect of the ankle, 2 small complex low attenuating collections are present, the largest measures approximately 0.9 x 0.7 x 0.8 cm. No vascular flow is noted.    IMPRESSION:  In the area of concern  along the medial ankle, 2 small complex collections which may be secondary to tiny phlegmon.    Electronically signed by:  Petra Rodriguez MD  4/5/2023 12:51 AM CDT Workstation: [id number]                                  Recent Results (from the past 6 hour(s))   CBC auto differential    Collection Time: 04/05/23 12:01 AM   Result Value Ref Range    WBC 6.92 3.90 - 12.70 K/uL    RBC 3.69 (L) 4.60 - 6.20 M/uL    Hemoglobin 10.5 (L) 14.0 - 18.0 g/dL    Hematocrit 32.2 (L) 40.0 - 54.0 %    MCV 87 82 - 98 fL    MCH 28.5 27.0 - 31.0 pg    MCHC 32.6 32.0 - 36.0 g/dL    RDW 14.0 11.5 - 14.5 %    Platelets 287 150 - 450 K/uL    MPV 9.0 (L) 9.2 - 12.9 fL    Immature Granulocytes 0.3 0.0 - 0.5 %    Gran # (ANC) 3.8 1.8 - 7.7 K/uL    Immature Grans (Abs) 0.02 0.00 - 0.04 K/uL    Lymph # 2.1 1.0 - 4.8 K/uL    Mono # 0.8 0.3 - 1.0 K/uL    Eos # 0.2 0.0 - 0.5 K/uL    Baso # 0.02 0.00 - 0.20 K/uL    nRBC 0 0 /100 WBC    Gran % 54.4 38.0 - 73.0 %    Lymph % 30.5 18.0 - 48.0 %    Mono % 12.0 4.0 - 15.0 %    Eosinophil % 2.5 0.0 - 8.0 %    Basophil % 0.3 0.0 - 1.9 %    Differential Method Automated    Comprehensive metabolic panel    Collection Time: 04/05/23 12:01 AM   Result Value Ref Range    Sodium 137 136 - 145 mmol/L    Potassium 3.8 3.5 - 5.1 mmol/L    Chloride 99 95 - 110 mmol/L    CO2 30 (H) 23 - 29 mmol/L    Glucose 125 (H) 70 - 110 mg/dL    BUN 24 (H) 6 - 20 mg/dL    Creatinine 1.0 0.5 - 1.4 mg/dL    Calcium 8.9 8.7 - 10.5 mg/dL    Total Protein 7.1 6.0 - 8.4 g/dL    Albumin 3.6 3.5 - 5.2 g/dL    Total Bilirubin 0.6 0.1 - 1.0 mg/dL    Alkaline Phosphatase 50 (L) 55 - 135 U/L    AST 16 10 - 40 U/L    ALT 19 10 - 44 U/L    Anion Gap 8 8 - 16 mmol/L    eGFR >60.0 >60 mL/min/1.73 m^2   C-reactive protein    Collection Time: 04/05/23 12:01 AM   Result Value Ref Range    CRP 1.96 (H) <0.76 mg/dL   Sedimentation rate    Collection Time: 04/05/23 12:01 AM   Result Value Ref Range    Sed Rate 52 (H) 0 - 10 mm/Hr

## 2023-04-05 NOTE — ED NOTES
EDEMA, DISCOLORATION AND BRUISING NOTED TO CALF AND R LEG. PT VOICES PAIN WITH TOUCHING AREAS. 2 SURGICAL AREAS NOTED. STITCHES PRESENT
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
## 2023-08-27 DIAGNOSIS — G89.4 CHRONIC PAIN SYNDROME: ICD-10-CM

## 2023-08-28 RX ORDER — METHOCARBAMOL 500 MG/1
TABLET, FILM COATED ORAL
Qty: 120 TABLET | Refills: 6 | Status: SHIPPED | OUTPATIENT
Start: 2023-08-28

## 2023-08-28 NOTE — TELEPHONE ENCOUNTER
Refill Routing Note     Refill Routing Note   Medication(s) are not appropriate for processing by [facility] for the following reason(s):      Medication outside of protocol  Responsible provider unclear    [facility] action(s):  Route Care Due:  None identified            Appointments  past 12m or future 3m with PCP    Date Provider   Last Visit   12/15/2022 Elmer Valadez MD   Next Visit   Visit date not found Elmer Valadez MD   ED visits in past 90 days: 0        Note composed:3:37 AM 08/28/2023

## 2024-01-11 DIAGNOSIS — G89.4 CHRONIC PAIN SYNDROME: ICD-10-CM

## 2024-01-12 NOTE — TELEPHONE ENCOUNTER
Refill Routing Note   Medication(s) are not appropriate for processing by [facility] for the following reason(s):        Outside of protocol  Responsible provider unclear    [facility] action(s):  Route               Appointments  past 12m or future 3m with PCP    Date Provider   Last Visit   12/15/2022 Elmer Valadez MD   Next Visit   Visit date not found Elmer Valadez MD   ED visits in past 90 days: 0        Note composed:8:00 PM 01/11/2024

## 2024-01-16 RX ORDER — GABAPENTIN 600 MG/1
TABLET ORAL
Qty: 90 TABLET | Refills: 0 | Status: SHIPPED | OUTPATIENT
Start: 2024-01-16 | End: 2024-01-17 | Stop reason: SDUPTHER

## 2024-01-17 ENCOUNTER — OFFICE VISIT (OUTPATIENT)
Dept: PRIMARY CARE CLINIC | Facility: CLINIC | Age: 44
End: 2024-01-17
Payer: MEDICARE

## 2024-01-17 DIAGNOSIS — G89.4 CHRONIC PAIN SYNDROME: ICD-10-CM

## 2024-01-17 PROCEDURE — 99213 OFFICE O/P EST LOW 20 MIN: CPT | Mod: 95,,, | Performed by: FAMILY MEDICINE

## 2024-01-17 RX ORDER — GABAPENTIN 600 MG/1
TABLET ORAL
Qty: 90 TABLET | Refills: 0 | Status: SHIPPED | OUTPATIENT
Start: 2024-01-17 | End: 2024-01-17 | Stop reason: SDUPTHER

## 2024-01-17 RX ORDER — GABAPENTIN 600 MG/1
TABLET ORAL
Qty: 90 TABLET | Refills: 0 | Status: SHIPPED | OUTPATIENT
Start: 2024-01-17
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
